# Patient Record
Sex: FEMALE | Race: WHITE | NOT HISPANIC OR LATINO | URBAN - METROPOLITAN AREA
[De-identification: names, ages, dates, MRNs, and addresses within clinical notes are randomized per-mention and may not be internally consistent; named-entity substitution may affect disease eponyms.]

---

## 2020-06-29 ENCOUNTER — EMERGENCY (EMERGENCY)
Facility: HOSPITAL | Age: 80
LOS: 0 days | Discharge: HOME | End: 2020-06-29
Payer: MEDICARE

## 2020-06-29 VITALS
RESPIRATION RATE: 16 BRPM | DIASTOLIC BLOOD PRESSURE: 55 MMHG | OXYGEN SATURATION: 100 % | SYSTOLIC BLOOD PRESSURE: 117 MMHG | HEART RATE: 68 BPM | TEMPERATURE: 98 F

## 2020-06-29 DIAGNOSIS — Z11.59 ENCOUNTER FOR SCREENING FOR OTHER VIRAL DISEASES: ICD-10-CM

## 2020-06-29 PROCEDURE — 99283 EMERGENCY DEPT VISIT LOW MDM: CPT

## 2020-06-29 PROCEDURE — U0003: CPT

## 2020-06-29 NOTE — ED STATDOCS - CLINICAL SUMMARY MEDICAL DECISION MAKING FREE TEXT BOX
patient presents with  COVID exposure.  As patient is nontoxic appearing will test for COVID and d/c.  Quarantine reviewed and return precautions reviewed.

## 2020-06-29 NOTE — ED STATDOCS - OBJECTIVE STATEMENT
Pt presents to ED with no complaints here for COVID swab.   Pt concerned for possible COVID-19.   Pt here for testing.

## 2020-06-29 NOTE — ED STATDOCS - PHYSICAL EXAMINATION
Constitutional: NAD AAOx3. Nontoxic, well appearing. Speaking full sentences  w/o distress  Eyes: EOMI, pupils equal  Head: Normocephalic atraumatic  Mouth: no airway obstruction  Cardiac: [id number]   Resp: Lungs CTAB  GI: Abd s/nt/nd  Neuro: motor and sensory intact

## 2020-06-29 NOTE — ED STATDOCS - PATIENT PORTAL LINK FT
You can access the FollowMyHealth Patient Portal offered by [facility] by registering at the following website: [email or web address]. By joining [facility]’s FollowMyHealth portal, you will also be able to view your health information using other applications (apps) compatible with our system.

## 2020-06-29 NOTE — ED ADULT NURSE NOTE - OBJECTIVE STATEMENT
DISCHARGE INSTRUCTIONS REVIEWED WITH PATIENT VERBALLY, PT VERBALIZED UNDERSTANDING OF DISCHARGE INSTRUCTIONS. PAPER COPY OF DISCHARGE INSTRUCTIONS GIVEN TO PATIENT WITH SELF GABE AND COVID 19 INFORMATION. Patient has given verbal consent for [facility] to call them with results.     [phone]

## 2020-06-30 LAB — SARS-COV-2 RNA SPEC QL NAA+PROBE: SIGNIFICANT CHANGE UP

## 2020-11-05 ENCOUNTER — APPOINTMENT (OUTPATIENT)
Dept: INTERNAL MEDICINE | Facility: CLINIC | Age: 80
End: 2020-11-05
Payer: MEDICARE

## 2020-11-05 VITALS — SYSTOLIC BLOOD PRESSURE: 110 MMHG | HEIGHT: 65 IN | DIASTOLIC BLOOD PRESSURE: 60 MMHG

## 2020-11-05 DIAGNOSIS — Z23 ENCOUNTER FOR IMMUNIZATION: ICD-10-CM

## 2020-11-05 DIAGNOSIS — K21.9 GASTRO-ESOPHAGEAL REFLUX DISEASE W/OUT ESOPHAGITIS: ICD-10-CM

## 2020-11-05 DIAGNOSIS — Z87.440 PERSONAL HISTORY OF URINARY (TRACT) INFECTIONS: ICD-10-CM

## 2020-11-05 DIAGNOSIS — S46.919A STRAIN OF UNSPECIFIED MUSCLE, FASCIA AND TENDON AT SHOULDER AND UPPER ARM LEVEL, UNSPECIFIED ARM, INITIAL ENCOUNTER: ICD-10-CM

## 2020-11-05 PROBLEM — Z00.00 ENCOUNTER FOR PREVENTIVE HEALTH EXAMINATION: Status: ACTIVE | Noted: 2020-11-05

## 2020-11-05 PROCEDURE — 90686 IIV4 VACC NO PRSV 0.5 ML IM: CPT

## 2020-11-05 PROCEDURE — G0008: CPT

## 2020-11-05 PROCEDURE — 99072 ADDL SUPL MATRL&STAF TM PHE: CPT

## 2020-11-05 PROCEDURE — 99213 OFFICE O/P EST LOW 20 MIN: CPT | Mod: 25

## 2020-11-05 RX ORDER — PANTOPRAZOLE SODIUM 20 MG/1
TABLET, DELAYED RELEASE ORAL
Refills: 0 | Status: ACTIVE | COMMUNITY

## 2020-11-05 RX ORDER — LOVASTATIN 40 MG/1
TABLET ORAL
Refills: 0 | Status: ACTIVE | COMMUNITY

## 2020-11-05 NOTE — PHYSICAL EXAM
[No Lymphadenopathy] : no lymphadenopathy [No Respiratory Distress] : no respiratory distress  [Clear to Auscultation] : lungs were clear to auscultation bilaterally [Normal] : normal rate, regular rhythm, normal S1 and S2 and no murmur heard [No Carotid Bruits] : no carotid bruits [No Edema] : there was no peripheral edema

## 2020-11-05 NOTE — HISTORY OF PRESENT ILLNESS
[de-identified] : 81 y/o female is in the office for flu shot.\par Has been well with no recent illness

## 2021-05-21 ENCOUNTER — INPATIENT (INPATIENT)
Facility: HOSPITAL | Age: 81
LOS: 3 days | Discharge: NURSING, REHAB OR LONG-TERM CARE | DRG: 522 | End: 2021-05-25
Attending: HOSPITALIST | Admitting: FAMILY MEDICINE
Payer: MEDICARE

## 2021-05-21 VITALS
DIASTOLIC BLOOD PRESSURE: 96 MMHG | SYSTOLIC BLOOD PRESSURE: 142 MMHG | WEIGHT: 151.02 LBS | RESPIRATION RATE: 18 BRPM | TEMPERATURE: 98 F | HEART RATE: 78 BPM | OXYGEN SATURATION: 98 % | HEIGHT: 65 IN

## 2021-05-21 DIAGNOSIS — S72.001A FRACTURE OF UNSPECIFIED PART OF NECK OF RIGHT FEMUR, INITIAL ENCOUNTER FOR CLOSED FRACTURE: ICD-10-CM

## 2021-05-21 LAB
ADD ON TEST-SPECIMEN IN LAB: SIGNIFICANT CHANGE UP
ADD ON TEST-SPECIMEN IN LAB: SIGNIFICANT CHANGE UP
ALBUMIN SERPL ELPH-MCNC: 4.3 G/DL — SIGNIFICANT CHANGE UP (ref 3.3–5)
ALP SERPL-CCNC: 51 U/L — SIGNIFICANT CHANGE UP (ref 40–120)
ALT FLD-CCNC: 22 U/L — SIGNIFICANT CHANGE UP (ref 12–78)
ANION GAP SERPL CALC-SCNC: 9 MMOL/L — SIGNIFICANT CHANGE UP (ref 5–17)
APPEARANCE UR: CLEAR — SIGNIFICANT CHANGE UP
APTT BLD: 31.4 SEC — SIGNIFICANT CHANGE UP (ref 27.5–35.5)
AST SERPL-CCNC: 24 U/L — SIGNIFICANT CHANGE UP (ref 15–37)
BASOPHILS # BLD AUTO: 0.04 K/UL — SIGNIFICANT CHANGE UP (ref 0–0.2)
BASOPHILS NFR BLD AUTO: 0.3 % — SIGNIFICANT CHANGE UP (ref 0–2)
BILIRUB SERPL-MCNC: 0.7 MG/DL — SIGNIFICANT CHANGE UP (ref 0.2–1.2)
BILIRUB UR-MCNC: NEGATIVE — SIGNIFICANT CHANGE UP
BUN SERPL-MCNC: 13 MG/DL — SIGNIFICANT CHANGE UP (ref 7–23)
CALCIUM SERPL-MCNC: 9.6 MG/DL — SIGNIFICANT CHANGE UP (ref 8.5–10.1)
CHLORIDE SERPL-SCNC: 101 MMOL/L — SIGNIFICANT CHANGE UP (ref 96–108)
CO2 SERPL-SCNC: 25 MMOL/L — SIGNIFICANT CHANGE UP (ref 22–31)
COLOR SPEC: YELLOW — SIGNIFICANT CHANGE UP
CREAT SERPL-MCNC: 0.47 MG/DL — LOW (ref 0.5–1.3)
DIFF PNL FLD: NEGATIVE — SIGNIFICANT CHANGE UP
EOSINOPHIL # BLD AUTO: 0.11 K/UL — SIGNIFICANT CHANGE UP (ref 0–0.5)
EOSINOPHIL NFR BLD AUTO: 0.9 % — SIGNIFICANT CHANGE UP (ref 0–6)
GLUCOSE SERPL-MCNC: 94 MG/DL — SIGNIFICANT CHANGE UP (ref 70–99)
GLUCOSE UR QL: NEGATIVE MG/DL — SIGNIFICANT CHANGE UP
HCT VFR BLD CALC: 39.1 % — SIGNIFICANT CHANGE UP (ref 34.5–45)
HGB BLD-MCNC: 13.1 G/DL — SIGNIFICANT CHANGE UP (ref 11.5–15.5)
IMM GRANULOCYTES NFR BLD AUTO: 0.5 % — SIGNIFICANT CHANGE UP (ref 0–1.5)
INR BLD: 0.99 RATIO — SIGNIFICANT CHANGE UP (ref 0.88–1.16)
KETONES UR-MCNC: ABNORMAL
LEUKOCYTE ESTERASE UR-ACNC: NEGATIVE — SIGNIFICANT CHANGE UP
LYMPHOCYTES # BLD AUTO: 1.37 K/UL — SIGNIFICANT CHANGE UP (ref 1–3.3)
LYMPHOCYTES # BLD AUTO: 11.3 % — LOW (ref 13–44)
MCHC RBC-ENTMCNC: 31.8 PG — SIGNIFICANT CHANGE UP (ref 27–34)
MCHC RBC-ENTMCNC: 33.5 GM/DL — SIGNIFICANT CHANGE UP (ref 32–36)
MCV RBC AUTO: 94.9 FL — SIGNIFICANT CHANGE UP (ref 80–100)
MONOCYTES # BLD AUTO: 0.72 K/UL — SIGNIFICANT CHANGE UP (ref 0–0.9)
MONOCYTES NFR BLD AUTO: 5.9 % — SIGNIFICANT CHANGE UP (ref 2–14)
NEUTROPHILS # BLD AUTO: 9.84 K/UL — HIGH (ref 1.8–7.4)
NEUTROPHILS NFR BLD AUTO: 81.1 % — HIGH (ref 43–77)
NITRITE UR-MCNC: NEGATIVE — SIGNIFICANT CHANGE UP
PH UR: 7 — SIGNIFICANT CHANGE UP (ref 5–8)
PLATELET # BLD AUTO: 208 K/UL — SIGNIFICANT CHANGE UP (ref 150–400)
POTASSIUM SERPL-MCNC: 3.9 MMOL/L — SIGNIFICANT CHANGE UP (ref 3.5–5.3)
POTASSIUM SERPL-SCNC: 3.9 MMOL/L — SIGNIFICANT CHANGE UP (ref 3.5–5.3)
PROT SERPL-MCNC: 7.1 GM/DL — SIGNIFICANT CHANGE UP (ref 6–8.3)
PROT UR-MCNC: NEGATIVE MG/DL — SIGNIFICANT CHANGE UP
PROTHROM AB SERPL-ACNC: 11.6 SEC — SIGNIFICANT CHANGE UP (ref 10.6–13.6)
RAPID RVP RESULT: SIGNIFICANT CHANGE UP
RBC # BLD: 4.12 M/UL — SIGNIFICANT CHANGE UP (ref 3.8–5.2)
RBC # FLD: 12.9 % — SIGNIFICANT CHANGE UP (ref 10.3–14.5)
SARS-COV-2 RNA SPEC QL NAA+PROBE: SIGNIFICANT CHANGE UP
SODIUM SERPL-SCNC: 135 MMOL/L — SIGNIFICANT CHANGE UP (ref 135–145)
SP GR SPEC: 1.01 — SIGNIFICANT CHANGE UP (ref 1.01–1.02)
TROPONIN I SERPL-MCNC: 0.1 NG/ML — HIGH (ref 0.01–0.04)
TROPONIN I SERPL-MCNC: 0.33 NG/ML — HIGH (ref 0.01–0.04)
UROBILINOGEN FLD QL: NEGATIVE MG/DL — SIGNIFICANT CHANGE UP
WBC # BLD: 12.14 K/UL — HIGH (ref 3.8–10.5)
WBC # FLD AUTO: 12.14 K/UL — HIGH (ref 3.8–10.5)

## 2021-05-21 PROCEDURE — 71045 X-RAY EXAM CHEST 1 VIEW: CPT | Mod: 26

## 2021-05-21 PROCEDURE — 97162 PT EVAL MOD COMPLEX 30 MIN: CPT | Mod: GP

## 2021-05-21 PROCEDURE — 82962 GLUCOSE BLOOD TEST: CPT

## 2021-05-21 PROCEDURE — C9399: CPT

## 2021-05-21 PROCEDURE — 73551 X-RAY EXAM OF FEMUR 1: CPT | Mod: 26,RT

## 2021-05-21 PROCEDURE — 82306 VITAMIN D 25 HYDROXY: CPT

## 2021-05-21 PROCEDURE — 81003 URINALYSIS AUTO W/O SCOPE: CPT

## 2021-05-21 PROCEDURE — 85027 COMPLETE CBC AUTOMATED: CPT

## 2021-05-21 PROCEDURE — 88311 DECALCIFY TISSUE: CPT

## 2021-05-21 PROCEDURE — U0003: CPT

## 2021-05-21 PROCEDURE — U0005: CPT

## 2021-05-21 PROCEDURE — 99285 EMERGENCY DEPT VISIT HI MDM: CPT

## 2021-05-21 PROCEDURE — 86769 SARS-COV-2 COVID-19 ANTIBODY: CPT

## 2021-05-21 PROCEDURE — 73502 X-RAY EXAM HIP UNI 2-3 VIEWS: CPT | Mod: 26,RT

## 2021-05-21 PROCEDURE — 82040 ASSAY OF SERUM ALBUMIN: CPT

## 2021-05-21 PROCEDURE — 84484 ASSAY OF TROPONIN QUANT: CPT

## 2021-05-21 PROCEDURE — C1776: CPT

## 2021-05-21 PROCEDURE — 85730 THROMBOPLASTIN TIME PARTIAL: CPT

## 2021-05-21 PROCEDURE — 85610 PROTHROMBIN TIME: CPT

## 2021-05-21 PROCEDURE — 80048 BASIC METABOLIC PNL TOTAL CA: CPT

## 2021-05-21 PROCEDURE — 97116 GAIT TRAINING THERAPY: CPT | Mod: GP

## 2021-05-21 PROCEDURE — 36415 COLL VENOUS BLD VENIPUNCTURE: CPT

## 2021-05-21 PROCEDURE — 73501 X-RAY EXAM HIP UNI 1 VIEW: CPT | Mod: RT

## 2021-05-21 PROCEDURE — 93306 TTE W/DOPPLER COMPLETE: CPT

## 2021-05-21 PROCEDURE — 88305 TISSUE EXAM BY PATHOLOGIST: CPT

## 2021-05-21 PROCEDURE — 93010 ELECTROCARDIOGRAM REPORT: CPT

## 2021-05-21 PROCEDURE — 97530 THERAPEUTIC ACTIVITIES: CPT | Mod: GP

## 2021-05-21 PROCEDURE — 93005 ELECTROCARDIOGRAM TRACING: CPT

## 2021-05-21 RX ORDER — ONDANSETRON 8 MG/1
4 TABLET, FILM COATED ORAL ONCE
Refills: 0 | Status: COMPLETED | OUTPATIENT
Start: 2021-05-21 | End: 2021-05-21

## 2021-05-21 RX ORDER — HYDROMORPHONE HYDROCHLORIDE 2 MG/ML
1 INJECTION INTRAMUSCULAR; INTRAVENOUS; SUBCUTANEOUS ONCE
Refills: 0 | Status: DISCONTINUED | OUTPATIENT
Start: 2021-05-21 | End: 2021-05-21

## 2021-05-21 RX ORDER — SODIUM CHLORIDE 9 MG/ML
1000 INJECTION, SOLUTION INTRAVENOUS
Refills: 0 | Status: DISCONTINUED | OUTPATIENT
Start: 2021-05-21 | End: 2021-05-22

## 2021-05-21 RX ORDER — SODIUM CHLORIDE 9 MG/ML
250 INJECTION INTRAMUSCULAR; INTRAVENOUS; SUBCUTANEOUS ONCE
Refills: 0 | Status: COMPLETED | OUTPATIENT
Start: 2021-05-21 | End: 2021-05-21

## 2021-05-21 RX ORDER — ASPIRIN/CALCIUM CARB/MAGNESIUM 324 MG
325 TABLET ORAL ONCE
Refills: 0 | Status: DISCONTINUED | OUTPATIENT
Start: 2021-05-21 | End: 2021-05-21

## 2021-05-21 RX ORDER — ASPIRIN/CALCIUM CARB/MAGNESIUM 324 MG
325 TABLET ORAL ONCE
Refills: 0 | Status: COMPLETED | OUTPATIENT
Start: 2021-05-21 | End: 2021-05-21

## 2021-05-21 RX ORDER — HYDROMORPHONE HYDROCHLORIDE 2 MG/ML
0.5 INJECTION INTRAMUSCULAR; INTRAVENOUS; SUBCUTANEOUS ONCE
Refills: 0 | Status: DISCONTINUED | OUTPATIENT
Start: 2021-05-21 | End: 2021-05-21

## 2021-05-21 RX ORDER — SODIUM CHLORIDE 9 MG/ML
1000 INJECTION, SOLUTION INTRAVENOUS
Refills: 0 | Status: DISCONTINUED | OUTPATIENT
Start: 2021-05-21 | End: 2021-05-21

## 2021-05-21 RX ADMIN — HYDROMORPHONE HYDROCHLORIDE 1 MILLIGRAM(S): 2 INJECTION INTRAMUSCULAR; INTRAVENOUS; SUBCUTANEOUS at 17:29

## 2021-05-21 RX ADMIN — Medication 325 MILLIGRAM(S): at 18:34

## 2021-05-21 RX ADMIN — HYDROMORPHONE HYDROCHLORIDE 0.5 MILLIGRAM(S): 2 INJECTION INTRAMUSCULAR; INTRAVENOUS; SUBCUTANEOUS at 15:42

## 2021-05-21 RX ADMIN — SODIUM CHLORIDE 250 MILLILITER(S): 9 INJECTION INTRAMUSCULAR; INTRAVENOUS; SUBCUTANEOUS at 15:42

## 2021-05-21 RX ADMIN — ONDANSETRON 4 MILLIGRAM(S): 8 TABLET, FILM COATED ORAL at 15:42

## 2021-05-21 NOTE — ED PROVIDER NOTE - CLINICAL SUMMARY MEDICAL DECISION MAKING FREE TEXT BOX
Pt here s/p fall, landing on her right hip. R leg externally rotated. Will obtain XR, pain control, reeval.

## 2021-05-21 NOTE — ED PROVIDER NOTE - MUSCULOSKELETAL, MLM
Spine appears normal, range of motion is not limited, no muscle or joint tenderness. 2cm, ecchymosis in the right hip. tender in the r hip. r leg externally rotated. both feet cold to touch. sensory intact.

## 2021-05-21 NOTE — ED PROVIDER NOTE - PROGRESS NOTE DETAILS
Spoke with Dr. Palla who recommends one adult aspirin, dvt prophylaxis and echo in am. He is aware pt is going to or tomorrow and will see pt. Darnell NOLASCO

## 2021-05-21 NOTE — ED ADULT NURSE REASSESSMENT NOTE - NS ED NURSE REASSESS COMMENT FT1
contacted MD pinzon with regard to elevated troponin and plan of care. MD bedoya advised that pt receive full dose aspirin if did not take asa this morning. Pt placed on the monitor after elevated troponin and MD bedoya advised that pt cannot go upstairs without hospitalist evaluation. Pt asymptomatic. will ctm

## 2021-05-21 NOTE — ED ADULT NURSE NOTE - OBJECTIVE STATEMENT
pt biba s/p mechanical fall. Pt reports difficulty bearing weight s/p fall due to pain. Upon exam, pain is elicited on palpation. pt in no acute distress.

## 2021-05-21 NOTE — ED ADULT NURSE NOTE - NSIMPLEMENTINTERV_GEN_ALL_ED
Implemented All Fall Risk Interventions:  [place] to call system. Call bell, personal items and telephone within reach. Instruct patient to call for assistance. Room bathroom lighting operational. Non-slip footwear when patient is off stretcher. Physically safe environment: no spills, clutter or unnecessary equipment. Stretcher in lowest position, wheels locked, appropriate side rails in place. Provide visual cue, wrist band, yellow gown, etc. Monitor gait and stability. Monitor for mental status changes and reorient to person, place, and time. Review medications for side effects contributing to fall risk. Reinforce activity limits and safety measures with patient and family.

## 2021-05-21 NOTE — ED ADULT TRIAGE NOTE - CHIEF COMPLAINT QUOTE
Patient had unwitnessed fall at home, patient reports mechanical fall, denies hitting head, denies LOC, patient fell onto right hip unable to stand after fall  found patient on ground thirty minutes after fall. Denies blood thinners. Patient given 100 fentanyl by EMS prior to arrival

## 2021-05-22 ENCOUNTER — TRANSCRIPTION ENCOUNTER (OUTPATIENT)
Age: 81
End: 2021-05-22

## 2021-05-22 ENCOUNTER — RESULT REVIEW (OUTPATIENT)
Age: 81
End: 2021-05-22

## 2021-05-22 DIAGNOSIS — I27.20 PULMONARY HYPERTENSION, UNSPECIFIED: ICD-10-CM

## 2021-05-22 DIAGNOSIS — I35.0 NONRHEUMATIC AORTIC (VALVE) STENOSIS: ICD-10-CM

## 2021-05-22 DIAGNOSIS — Z01.810 ENCOUNTER FOR PREPROCEDURAL CARDIOVASCULAR EXAMINATION: ICD-10-CM

## 2021-05-22 DIAGNOSIS — R77.8 OTHER SPECIFIED ABNORMALITIES OF PLASMA PROTEINS: ICD-10-CM

## 2021-05-22 DIAGNOSIS — E78.5 HYPERLIPIDEMIA, UNSPECIFIED: ICD-10-CM

## 2021-05-22 LAB
24R-OH-CALCIDIOL SERPL-MCNC: 54.1 NG/ML — SIGNIFICANT CHANGE UP (ref 30–80)
ALBUMIN SERPL ELPH-MCNC: 3.4 G/DL — SIGNIFICANT CHANGE UP (ref 3.3–5)
ANION GAP SERPL CALC-SCNC: 6 MMOL/L — SIGNIFICANT CHANGE UP (ref 5–17)
APTT BLD: 28.2 SEC — SIGNIFICANT CHANGE UP (ref 27.5–35.5)
BUN SERPL-MCNC: 6 MG/DL — LOW (ref 7–23)
CALCIUM SERPL-MCNC: 8.8 MG/DL — SIGNIFICANT CHANGE UP (ref 8.5–10.1)
CHLORIDE SERPL-SCNC: 101 MMOL/L — SIGNIFICANT CHANGE UP (ref 96–108)
CO2 SERPL-SCNC: 24 MMOL/L — SIGNIFICANT CHANGE UP (ref 22–31)
CREAT SERPL-MCNC: 0.39 MG/DL — LOW (ref 0.5–1.3)
GLUCOSE SERPL-MCNC: 103 MG/DL — HIGH (ref 70–99)
HCT VFR BLD CALC: 33.7 % — LOW (ref 34.5–45)
HCT VFR BLD CALC: 35.6 % — SIGNIFICANT CHANGE UP (ref 34.5–45)
HGB BLD-MCNC: 11.5 G/DL — SIGNIFICANT CHANGE UP (ref 11.5–15.5)
HGB BLD-MCNC: 11.7 G/DL — SIGNIFICANT CHANGE UP (ref 11.5–15.5)
INR BLD: 1.1 RATIO — SIGNIFICANT CHANGE UP (ref 0.88–1.16)
MCHC RBC-ENTMCNC: 31.5 PG — SIGNIFICANT CHANGE UP (ref 27–34)
MCHC RBC-ENTMCNC: 31.9 PG — SIGNIFICANT CHANGE UP (ref 27–34)
MCHC RBC-ENTMCNC: 32.9 GM/DL — SIGNIFICANT CHANGE UP (ref 32–36)
MCHC RBC-ENTMCNC: 34.1 GM/DL — SIGNIFICANT CHANGE UP (ref 32–36)
MCV RBC AUTO: 93.4 FL — SIGNIFICANT CHANGE UP (ref 80–100)
MCV RBC AUTO: 95.7 FL — SIGNIFICANT CHANGE UP (ref 80–100)
PLATELET # BLD AUTO: 160 K/UL — SIGNIFICANT CHANGE UP (ref 150–400)
PLATELET # BLD AUTO: 164 K/UL — SIGNIFICANT CHANGE UP (ref 150–400)
POTASSIUM SERPL-MCNC: 3.8 MMOL/L — SIGNIFICANT CHANGE UP (ref 3.5–5.3)
POTASSIUM SERPL-SCNC: 3.8 MMOL/L — SIGNIFICANT CHANGE UP (ref 3.5–5.3)
PROTHROM AB SERPL-ACNC: 12.7 SEC — SIGNIFICANT CHANGE UP (ref 10.6–13.6)
RBC # BLD: 3.61 M/UL — LOW (ref 3.8–5.2)
RBC # BLD: 3.72 M/UL — LOW (ref 3.8–5.2)
RBC # FLD: 12.8 % — SIGNIFICANT CHANGE UP (ref 10.3–14.5)
RBC # FLD: 12.9 % — SIGNIFICANT CHANGE UP (ref 10.3–14.5)
SODIUM SERPL-SCNC: 131 MMOL/L — LOW (ref 135–145)
TROPONIN I SERPL-MCNC: 0.24 NG/ML — HIGH (ref 0.01–0.04)
WBC # BLD: 12.12 K/UL — HIGH (ref 3.8–10.5)
WBC # BLD: 9.66 K/UL — SIGNIFICANT CHANGE UP (ref 3.8–10.5)
WBC # FLD AUTO: 12.12 K/UL — HIGH (ref 3.8–10.5)
WBC # FLD AUTO: 9.66 K/UL — SIGNIFICANT CHANGE UP (ref 3.8–10.5)

## 2021-05-22 PROCEDURE — 99223 1ST HOSP IP/OBS HIGH 75: CPT

## 2021-05-22 PROCEDURE — 93306 TTE W/DOPPLER COMPLETE: CPT | Mod: 26

## 2021-05-22 PROCEDURE — 73501 X-RAY EXAM HIP UNI 1 VIEW: CPT | Mod: 26,RT

## 2021-05-22 PROCEDURE — 88311 DECALCIFY TISSUE: CPT | Mod: 26

## 2021-05-22 PROCEDURE — 93010 ELECTROCARDIOGRAM REPORT: CPT

## 2021-05-22 PROCEDURE — 88305 TISSUE EXAM BY PATHOLOGIST: CPT | Mod: 26

## 2021-05-22 RX ORDER — METOPROLOL TARTRATE 50 MG
12.5 TABLET ORAL EVERY 12 HOURS
Refills: 0 | Status: DISCONTINUED | OUTPATIENT
Start: 2021-05-22 | End: 2021-05-22

## 2021-05-22 RX ORDER — SENNA PLUS 8.6 MG/1
2 TABLET ORAL AT BEDTIME
Refills: 0 | Status: DISCONTINUED | OUTPATIENT
Start: 2021-05-22 | End: 2021-05-25

## 2021-05-22 RX ORDER — OXYCODONE HYDROCHLORIDE 5 MG/1
2.5 TABLET ORAL EVERY 4 HOURS
Refills: 0 | Status: DISCONTINUED | OUTPATIENT
Start: 2021-05-22 | End: 2021-05-22

## 2021-05-22 RX ORDER — SODIUM CHLORIDE 9 MG/ML
1000 INJECTION INTRAMUSCULAR; INTRAVENOUS; SUBCUTANEOUS
Refills: 0 | Status: DISCONTINUED | OUTPATIENT
Start: 2021-05-22 | End: 2021-05-22

## 2021-05-22 RX ORDER — ATORVASTATIN CALCIUM 80 MG/1
40 TABLET, FILM COATED ORAL AT BEDTIME
Refills: 0 | Status: DISCONTINUED | OUTPATIENT
Start: 2021-05-22 | End: 2021-05-25

## 2021-05-22 RX ORDER — HYDROMORPHONE HYDROCHLORIDE 2 MG/ML
0.5 INJECTION INTRAMUSCULAR; INTRAVENOUS; SUBCUTANEOUS
Refills: 0 | Status: DISCONTINUED | OUTPATIENT
Start: 2021-05-22 | End: 2021-05-22

## 2021-05-22 RX ORDER — ERYTHROMYCIN BASE 5 MG/GRAM
1 OINTMENT (GRAM) OPHTHALMIC (EYE)
Refills: 0 | Status: DISCONTINUED | OUTPATIENT
Start: 2021-05-22 | End: 2021-05-25

## 2021-05-22 RX ORDER — FENTANYL CITRATE 50 UG/ML
50 INJECTION INTRAVENOUS
Refills: 0 | Status: DISCONTINUED | OUTPATIENT
Start: 2021-05-22 | End: 2021-05-22

## 2021-05-22 RX ORDER — OXYCODONE HYDROCHLORIDE 5 MG/1
5 TABLET ORAL ONCE
Refills: 0 | Status: DISCONTINUED | OUTPATIENT
Start: 2021-05-22 | End: 2021-05-22

## 2021-05-22 RX ORDER — SODIUM CHLORIDE 9 MG/ML
1000 INJECTION INTRAMUSCULAR; INTRAVENOUS; SUBCUTANEOUS
Refills: 0 | Status: DISCONTINUED | OUTPATIENT
Start: 2021-05-22 | End: 2021-05-23

## 2021-05-22 RX ORDER — ENOXAPARIN SODIUM 100 MG/ML
40 INJECTION SUBCUTANEOUS EVERY 24 HOURS
Refills: 0 | Status: DISCONTINUED | OUTPATIENT
Start: 2021-05-23 | End: 2021-05-23

## 2021-05-22 RX ORDER — SODIUM CHLORIDE 9 MG/ML
1000 INJECTION, SOLUTION INTRAVENOUS
Refills: 0 | Status: DISCONTINUED | OUTPATIENT
Start: 2021-05-22 | End: 2021-05-22

## 2021-05-22 RX ORDER — ACETAMINOPHEN 500 MG
975 TABLET ORAL EVERY 8 HOURS
Refills: 0 | Status: DISCONTINUED | OUTPATIENT
Start: 2021-05-22 | End: 2021-05-22

## 2021-05-22 RX ORDER — MAGNESIUM HYDROXIDE 400 MG/1
30 TABLET, CHEWABLE ORAL DAILY
Refills: 0 | Status: DISCONTINUED | OUTPATIENT
Start: 2021-05-22 | End: 2021-05-25

## 2021-05-22 RX ORDER — ACETAMINOPHEN 500 MG
975 TABLET ORAL EVERY 8 HOURS
Refills: 0 | Status: DISCONTINUED | OUTPATIENT
Start: 2021-05-22 | End: 2021-05-25

## 2021-05-22 RX ORDER — METOPROLOL TARTRATE 50 MG
12.5 TABLET ORAL EVERY 12 HOURS
Refills: 0 | Status: DISCONTINUED | OUTPATIENT
Start: 2021-05-22 | End: 2021-05-25

## 2021-05-22 RX ORDER — OXYCODONE HYDROCHLORIDE 5 MG/1
2.5 TABLET ORAL EVERY 4 HOURS
Refills: 0 | Status: DISCONTINUED | OUTPATIENT
Start: 2021-05-22 | End: 2021-05-25

## 2021-05-22 RX ORDER — LOVASTATIN 20 MG
1 TABLET ORAL
Qty: 0 | Refills: 0 | DISCHARGE

## 2021-05-22 RX ORDER — CEFAZOLIN SODIUM 1 G
2000 VIAL (EA) INJECTION EVERY 8 HOURS
Refills: 0 | Status: COMPLETED | OUTPATIENT
Start: 2021-05-22 | End: 2021-05-23

## 2021-05-22 RX ORDER — OXYCODONE HYDROCHLORIDE 5 MG/1
5 TABLET ORAL EVERY 4 HOURS
Refills: 0 | Status: DISCONTINUED | OUTPATIENT
Start: 2021-05-22 | End: 2021-05-25

## 2021-05-22 RX ORDER — ATORVASTATIN CALCIUM 80 MG/1
10 TABLET, FILM COATED ORAL AT BEDTIME
Refills: 0 | Status: DISCONTINUED | OUTPATIENT
Start: 2021-05-22 | End: 2021-05-22

## 2021-05-22 RX ORDER — MORPHINE SULFATE 50 MG/1
1 CAPSULE, EXTENDED RELEASE ORAL
Refills: 0 | Status: DISCONTINUED | OUTPATIENT
Start: 2021-05-22 | End: 2021-05-22

## 2021-05-22 RX ORDER — SENNA PLUS 8.6 MG/1
2 TABLET ORAL AT BEDTIME
Refills: 0 | Status: DISCONTINUED | OUTPATIENT
Start: 2021-05-22 | End: 2021-05-22

## 2021-05-22 RX ORDER — TRAMADOL HYDROCHLORIDE 50 MG/1
50 TABLET ORAL EVERY 4 HOURS
Refills: 0 | Status: DISCONTINUED | OUTPATIENT
Start: 2021-05-22 | End: 2021-05-25

## 2021-05-22 RX ORDER — ATORVASTATIN CALCIUM 80 MG/1
40 TABLET, FILM COATED ORAL AT BEDTIME
Refills: 0 | Status: DISCONTINUED | OUTPATIENT
Start: 2021-05-22 | End: 2021-05-22

## 2021-05-22 RX ORDER — ASPIRIN/CALCIUM CARB/MAGNESIUM 324 MG
81 TABLET ORAL DAILY
Refills: 0 | Status: DISCONTINUED | OUTPATIENT
Start: 2021-05-22 | End: 2021-05-22

## 2021-05-22 RX ADMIN — SODIUM CHLORIDE 125 MILLILITER(S): 9 INJECTION INTRAMUSCULAR; INTRAVENOUS; SUBCUTANEOUS at 16:13

## 2021-05-22 RX ADMIN — ATORVASTATIN CALCIUM 40 MILLIGRAM(S): 80 TABLET, FILM COATED ORAL at 21:41

## 2021-05-22 RX ADMIN — Medication 81 MILLIGRAM(S): at 09:26

## 2021-05-22 RX ADMIN — Medication 975 MILLIGRAM(S): at 22:40

## 2021-05-22 RX ADMIN — MORPHINE SULFATE 1 MILLIGRAM(S): 50 CAPSULE, EXTENDED RELEASE ORAL at 04:15

## 2021-05-22 RX ADMIN — SODIUM CHLORIDE 75 MILLILITER(S): 9 INJECTION INTRAMUSCULAR; INTRAVENOUS; SUBCUTANEOUS at 18:11

## 2021-05-22 RX ADMIN — TRAMADOL HYDROCHLORIDE 50 MILLIGRAM(S): 50 TABLET ORAL at 20:13

## 2021-05-22 RX ADMIN — SODIUM CHLORIDE 75 MILLILITER(S): 9 INJECTION, SOLUTION INTRAVENOUS at 14:50

## 2021-05-22 RX ADMIN — Medication 100 MILLIGRAM(S): at 21:41

## 2021-05-22 RX ADMIN — OXYCODONE HYDROCHLORIDE 5 MILLIGRAM(S): 5 TABLET ORAL at 14:48

## 2021-05-22 RX ADMIN — Medication 12.5 MILLIGRAM(S): at 21:41

## 2021-05-22 RX ADMIN — TRAMADOL HYDROCHLORIDE 50 MILLIGRAM(S): 50 TABLET ORAL at 22:14

## 2021-05-22 RX ADMIN — Medication 975 MILLIGRAM(S): at 06:07

## 2021-05-22 RX ADMIN — SODIUM CHLORIDE 100 MILLILITER(S): 9 INJECTION, SOLUTION INTRAVENOUS at 04:14

## 2021-05-22 RX ADMIN — Medication 975 MILLIGRAM(S): at 21:40

## 2021-05-22 RX ADMIN — Medication 1 DROP(S): at 18:42

## 2021-05-22 RX ADMIN — SENNA PLUS 2 TABLET(S): 8.6 TABLET ORAL at 21:41

## 2021-05-22 RX ADMIN — Medication 12.5 MILLIGRAM(S): at 09:25

## 2021-05-22 RX ADMIN — Medication 1 APPLICATION(S): at 21:42

## 2021-05-22 NOTE — DISCHARGE NOTE PROVIDER - NSDCFUADDINST_GEN_ALL_CORE_FT
1. Pain Control: take pain medications as needed and as prescribed  2. Full weight bearing as tolerated, with assistive devices (walker/cane) as needed  3. DVT Prophylaxis: See Med Rec for details.  4. Physical therapy as needed  5. Follow up with Dr. Taylor as outpatient in 10-14 days after discharge from the Hospital or Rehab. Please call office for appointment.  6. Arielle dressing in place, can remain in place until post-op day 7, then dry dressing okay with daily dressing changes as needed. Remove staples post-op day 14.  7. Ice affected area as needed  8. Keep dressing clean and dry   Discharge Instructions for Right Hip Hemiarthroplasty:    1. PAIN CONTROL: See Med Rec.  2. ACTIVITY: Weight Bearing as Tolerated with assistance and rolling walker. Posterior Hip Precautions. Abduction pillow while in bed or chair for 6 weeks.  3. PT: daily, Posterior Hip Precautions.  4. DVT/PE PROPHYLAXIS: Continue DVT/PE Prophylaxis. See Med Rec for Duration and dose.  5. BANDAGE: Change bandage on POD7 (5/29/21) to Mepilex Ag or other silver impregnated dressing. Also change PRN if saturated. Do NOT remove on arrival to inspect wound.  6. SHOWER: Can shower after POD7.  7. STAPLES/SUTURES: Remove by RN POD14 (6/5/21).  8. FOLLOW UP: Follow-up with Dr. Taylor in 1 month. Call office for appointment. Please perform portable x-rays of right hip and femur at Rehab POD 14 (6/5/21) before follow up.

## 2021-05-22 NOTE — PROGRESS NOTE ADULT - SUBJECTIVE AND OBJECTIVE BOX
Orthopedics Post-op Check    POD 0 R hip hemiarthroplasty  Pt seen and examined at the bedside. Pain is well controlled at this time. Pt complaining R eye pain since awakening in PACU. Pt is aware of plan and agrees, no concerns at this time.     Vital Signs Last 24 Hrs  T(C): 36.9 (05-22-21 @ 15:30), Max: 37.6 (05-22-21 @ 04:50)  T(F): 98.4 (05-22-21 @ 15:30), Max: 99.7 (05-22-21 @ 04:50)  HR: 57 (05-22-21 @ 15:30) (54 - 78)  BP: 123/51 (05-22-21 @ 15:30) (86/56 - 140/63)  BP(mean): 75 (05-22-21 @ 00:22) (75 - 83)  RR: 17 (05-22-21 @ 15:30) (12 - 18)  SpO2: 99% (05-22-21 @ 15:30) (95% - 100%)                        11.7   12.12 )-----------( 160      ( 22 May 2021 14:39 )             35.6     22 May 2021 14:39    131    |  101    |  6      ----------------------------<  103    3.8     |  24     |  0.39     Ca    8.8        22 May 2021 14:39    TPro  x      /  Alb  3.4    /  TBili  x      /  DBili  x      /  AST  x      /  ALT  x      /  AlkPhos  x      22 May 2021 05:30    PT/INR - ( 22 May 2021 05:30 )   PT: 12.7 sec;   INR: 1.10 ratio         PTT - ( 22 May 2021 05:30 )  PTT:28.2 sec    Exam:  GEN: NAD, awake and alert.  Eyes: Increased tearing from the R eye, pain with opening, no increased erythema  RLE:  Dressing clean and dry, FEDERICO  In abduction pillow  +EHL FHL TA GS  SILT L2-S1  +DP  Calf soft and nontender, compartments soft and compressible.

## 2021-05-22 NOTE — H&P ADULT - ASSESSMENT
79 y/o F PMHx significant for GERD, recurrent UTIs, and osteoarthritis who is a community ambulator via assistive devices (walker) presents to  for further evaluation and management of c/o severe right hip pain s/p unwitnessed fall at home.  The patient states that she tripped and fell onto her right side while attempting to ambulate to her kitchen. She states that she was unable to bear weight after the all prompting today's visit to . The patient was found down by her  who then notified EMS. She denies any associated prodrome of chest pain, palpitations, blurred vision, dizziness, or associated head trauma. Imaging in the ED revealed a right femoral neck fracture    #Acute Right Femoral Neck Fracture  ~admit to Medicine  ~f/u w/ Orthopedics  ~at this time plan is for surgical intervention via right hip hemiarthroplasty on 5/22  ~f/u pre-op labs in the am  ~NPO after midnight on 5/22  ~IVFs while NPO  ~NWB RLE  ~cont. pain management    #Elevated Cardiac Markers  ~TnI => 0.098 -> 0.330 -> 0.240  ~patient denies any symptoms of chest pain/shortness of breath  ~f/u 2DECHO in the am  ~f/u w/ Cardiology for further kami-operative management    #Vte ppx  ~chemical Vte ppx held after MN on 5/22  ~please f/u w/ Orthopedics concerning kami-post operative Vte ppx   81 y/o F PMHx significant for GERD, recurrent UTIs, and osteoarthritis who is a community ambulator via assistive devices (walker) presents to  for further evaluation and management of c/o severe right hip pain s/p unwitnessed fall at home.  The patient states that she tripped and fell onto her right side while attempting to ambulate to her kitchen. She states that she was unable to bear weight after the all prompting today's visit to . The patient was found down by her  who then notified EMS. She denies any associated prodrome of chest pain, palpitations, blurred vision, dizziness, or associated head trauma. Imaging in the ED revealed a right femoral neck fracture    #Acute Right Femoral Neck Fracture  ~admit to Medicine  ~f/u w/ Orthopedics  ~at this time plan is for surgical intervention via right hip hemiarthroplasty on 5/22  ~f/u pre-op labs in the am  ~NPO after midnight on 5/22  ~IVFs while NPO  ~NWB RLE  ~cont. pain management    #Elevated Cardiac Markers  ~TnI => 0.098 -> 0.330 -> 0.240  ~etiologies include overall demand ischemia  ~patient denies any symptoms of chest pain/shortness of breath  ~f/u 2DECHO in the am  ~f/u w/ Cardiology for further kami-operative management    #Vte ppx  ~chemical Vte ppx held after MN on 5/22  ~please f/u w/ Orthopedics concerning kami-post operative Vte ppx

## 2021-05-22 NOTE — PROGRESS NOTE ADULT - SUBJECTIVE AND OBJECTIVE BOX
HPI:  79 y/o F PMHx significant for GERD, recurrent UTIs, and osteoarthritis who is a community ambulator via assistive devices (walker) presents to  for further evaluation and management of c/o severe right hip pain s/p unwitnessed fall at home.  The patient states that she tripped and fell onto her right side while attempting to ambulate to her kitchen. She states that she was unable to bear weight after the all prompting today's visit to . The patient was found down by her  who then notified EMS. She denies any associated prodrome of chest pain, palpitations, blurred vision, dizziness, or associated head trauma. Imaging in the ED revealed a right femoral neck fracture    Subjective: Patient seen and examined at bedside, resting comfortably. Pain well controlled. Denies headache, lightheadedness, dizziness, chest pain, dyspnea, n/v, numbness/tingling. No complaints at this time. No overnight events.    REVIEW OF SYSTEMS:  CONSTITUTIONAL: No weakness, fevers or chills  EYES/ENT: No visual changes;  No vertigo or throat pain   NECK: No pain or stiffness  RESPIRATORY: No cough, wheezing, hemoptysis; No shortness of breath  CARDIOVASCULAR: No chest pain or palpitations  GASTROINTESTINAL: No abdominal or epigastric pain. No nausea, vomiting, or hematemesis; No diarrhea or constipation. No melena or hematochezia.  GENITOURINARY: No dysuria, frequency or hematuria  NEUROLOGICAL: No numbness or weakness  SKIN: No itching, burning, rashes, or lesions   All other review of systems is negative unless indicated above    PHYSICAL EXAM:  Vital Signs Last 24 Hrs  T(C): 37.6 (22 May 2021 04:50), Max: 37.6 (22 May 2021 04:50)  T(F): 99.7 (22 May 2021 04:50), Max: 99.7 (22 May 2021 04:50)  HR: 78 (22 May 2021 04:50) (73 - 78)  BP: 140/53 (22 May 2021 04:50) (138/78 - 142/96)  BP(mean): 75 (22 May 2021 00:22) (75 - 83)  RR: 15 (22 May 2021 04:50) (13 - 18)  SpO2: 95% (22 May 2021 04:50) (95% - 100%)    Constitutional: resting, no acute distress  HEENT: EOMI, normal hearing, moist mucous membranes  Neck: Soft and supple, no JVD  Respiratory: CTABL, No wheezing, rales or rhonchi  Cardiovascular: S1S2+, RRR, no M/G/R  Gastrointestinal: BS+, soft, NT/ND, no guarding, no rebound  Extremities: No peripheral edema, :right leg shortened and rotated externally  Vascular: 2+ peripheral pulses  Neurological: AAOx3, no focal deficits  Skin: No rashes    MEDICATIONS:  MEDICATIONS  (STANDING):  acetaminophen   Tablet .. 975 milliGRAM(s) Oral every 8 hours  atorvastatin 10 milliGRAM(s) Oral at bedtime  senna 2 Tablet(s) Oral at bedtime      LABS: All Labs Reviewed:                        11.5   9.66  )-----------( 164      ( 22 May 2021 05:30 )             33.7     05-22    129<L>  |  99  |  7   ----------------------------<  102<H>  3.7   |  24  |  0.33<L>    Ca    8.7      22 May 2021 05:30    TPro  x   /  Alb  3.4  /  TBili  x   /  DBili  x   /  AST  x   /  ALT  x   /  AlkPhos  x   05-22    PT/INR - ( 22 May 2021 05:30 )   PT: 12.7 sec;   INR: 1.10 ratio         PTT - ( 22 May 2021 05:30 )  PTT:28.2 sec  CARDIAC MARKERS ( 22 May 2021 01:04 )  0.240 ng/mL / x     / x     / x     / x      CARDIAC MARKERS ( 21 May 2021 21:14 )  0.330 ng/mL / x     / x     / x     / x      CARDIAC MARKERS ( 21 May 2021 15:08 )  0.098 ng/mL / x     / 78 U/L / x     / x          Blood Culture:   I&O's Summary    CAPILLARY BLOOD GLUCOSE          RADIOLOGY/EKG:

## 2021-05-22 NOTE — DISCHARGE NOTE PROVIDER - CARE PROVIDER_API CALL
Shahram Taylor)  Orthopaedic Surgery  [address] [place] [address] [place] [address]  Phone: [phone]  Fax: [phone]  Follow Up Time:

## 2021-05-22 NOTE — CONSULT NOTE ADULT - PROBLEM SELECTOR RECOMMENDATION 2
patient without cardiac symptoms with fair exercise capacity , with minimal elevated troponin trending down , with normal left ventricular right ventricular function , mostly due to demand ischemia ,  continue ecotrin , statin , will add low dose metoprolol .

## 2021-05-22 NOTE — H&P ADULT - NSHPPHYSICALEXAM_GEN_ALL_CORE
Vital Signs Last 24 Hrs  T(C): 36.7 (22 May 2021 00:22), Max: 36.7 (21 May 2021 14:25)  T(F): 98 (22 May 2021 00:22), Max: 98.1 (21 May 2021 14:25)  HR: 76 (22 May 2021 00:22) (73 - 78)  BP: 138/78 (22 May 2021 00:22) (138/78 - 142/96)  BP(mean): 75 (22 May 2021 00:22) (75 - 83)  RR: 15 (22 May 2021 00:22) (13 - 18)  SpO2: 96% (22 May 2021 00:22) (96% - 100%)

## 2021-05-22 NOTE — DISCHARGE NOTE PROVIDER - NSDCCPCAREPLAN_GEN_ALL_CORE_FT
PRINCIPAL DISCHARGE DIAGNOSIS  Diagnosis: Closed fracture of neck of right femur, initial encounter  Assessment and Plan of Treatment:        PRINCIPAL DISCHARGE DIAGNOSIS  Diagnosis: Closed fracture of neck of right femur, initial encounter  Assessment and Plan of Treatment: You were seen by Orthopedics and operated on your right hip. You were also evaluated by Physical Therapy and will continue your recovery in rehabilitation. Please continue to follow with your orthopedic surgeon and primary care physician for medication and pain management.       PRINCIPAL DISCHARGE DIAGNOSIS  Diagnosis: Closed fracture of neck of right femur, initial encounter  Assessment and Plan of Treatment: You were seen by Orthopedics and operated on your right hip. You were also evaluated by Physical Therapy and will continue your recovery in rehabilitation. Please continue to follow with your orthopedic surgeon and primary care physician for medication and pain   management.  You will need to take Xarelto for 35 days after your operations to prevent the formation of a blood clot wheile your mobility is decreased. Your last day to take Xarelto is 6/26/21. While on Xarelto please take protonix to protect the lining of your stomach while taking blood thinners.

## 2021-05-22 NOTE — CONSULT NOTE ADULT - PROBLEM SELECTOR RECOMMENDATION 5
moderate P HTN no prior echo to compare , has hx of smoking in the past  CXR finding suggestive COPD ,   which need to be followed as OP , will follow

## 2021-05-22 NOTE — H&P ADULT - HISTORY OF PRESENT ILLNESS
81 y/o F PMHx significant for GERD, recurrent UTIs, and osteoarthritis who is a community ambulator via assistive devices (walker) presents to  for further evaluation and management of c/o severe right hip pain s/p unwitnessed fall at home.  The patient states that she tripped and fell onto her right side while attempting to ambulate to her kitchen. She states that she was unable to bear weight after the all prompting today's visit to . The patient was found down by her  who then notified EMS. She denies any associated prodrome of chest pain, palpitations, blurred vision, dizziness, or associated head trauma. Imaging in the ED revealed a right femoral neck fracture

## 2021-05-22 NOTE — PROGRESS NOTE ADULT - SUBJECTIVE AND OBJECTIVE BOX
Patient seen and examined at bedside, resting comfortably. Pain well controlled. Denies headache, lightheadedness, dizziness, chest pain, dyspnea, n/v, numbness/tingling. No complaints at this time. No overnight events.      LABS:                        11.5   9.66  )-----------( 164      ( 22 May 2021 05:30 )             33.7         129<L>  |  99  |  7   ----------------------------<  102<H>  3.7   |  24  |  0.33<L>    Ca    8.7      22 May 2021 05:30    TPro  x   /  Alb  3.4  /  TBili  x   /  DBili  x   /  AST  x   /  ALT  x   /  AlkPhos  x       PT/INR - ( 22 May 2021 05:30 )   PT: 12.7 sec;   INR: 1.10 ratio         PTT - ( 22 May 2021 05:30 )  PTT:28.2 sec  Urinalysis Basic - ( 21 May 2021 19:34 )    Color: Yellow / Appearance: Clear / S.010 / pH: x  Gluc: x / Ketone: Moderate  / Bili: Negative / Urobili: Negative mg/dL   Blood: x / Protein: Negative mg/dL / Nitrite: Negative   Leuk Esterase: Negative / RBC: x / WBC x   Sq Epi: x / Non Sq Epi: x / Bacteria: x          Vitals  T(C): 37.6 (21 @ 04:50), Max: 37.6 (21 @ 04:50)  HR: 78 (21 @ 04:50) (73 - 78)  BP: 140/53 (21 @ 04:50) (138/78 - 142/96)  RR: 15 (21 @ 04:50) (13 - 18)  SpO2: 95% (21 @ 04:50) (95% - 100%)      PHYSICAL EXAM  General: NAD, Awake and Alert    RIGHT Lower Extremity:  Skin c/d/i  SCDs in place  L2-S1 SILT  +TA/EHL/FHL/GSC  + DP pulses  Compartments soft and compressible  Calves nontender

## 2021-05-22 NOTE — PROGRESS NOTE ADULT - ASSESSMENT
A/P:  80y M s/p R Hip HemiArthroplasty POD #0    -Advance diet as tolerated  -Discussed the eye findings with medical team.   -Pain control prn  - DVT ppx: FU AC recs  - WBAT/PT/OOB as tolerated   - Hip precautions  - FU am labs  - Med mgmt, continue home meds.  -FU PT eval  -Will reassess in am.

## 2021-05-22 NOTE — DISCHARGE NOTE PROVIDER - HOSPITAL COURSE
Orthopedic Summary  H&P:  Pt is a 80y Female    PAST MEDICAL & SURGICAL HISTORY:  No pertinent past medical history    No significant past surgical history          Now s/p Right Hip Hemiarthroplasty for fracture. Pt is afebrile with stable vital signs. Pain is controlled. Exam reveals intact EHL FHL TA GS, +DP. Dressing is clean and dry.    Hospital Course:  Patient presented to [facility] ED after a fall, found to have a right hip fracture, and admitted to the Medical Service. Pt was  medically/cardiac cleared prior to surgery. Prophylactic antibiotics were started before the procedure and continued for 24 hours. They were admitted after surgery to the orthopedic floor.  There were no orthopedic complications during the hospital stay. All home medications were continued.    Routine consults were obtained from the Anticoagulation Team for DVT/PE prophylaxis, from Physical Therapy, and followed by Medicine for Co-management. Patient was placed on  anticoagulation.  Pertinent home medications were continued.  Daily labs were followed.      On POD 0 there were no major issues. Pt received PT daily and was Discharged once cleared per Medicine.  The orthopedic Attending is aware and agrees. See addendum to DC summary per medical team below for any additional info or if any changes. Orthopedic Summary  H&P:  Pt is a 80y Female    PAST MEDICAL & SURGICAL HISTORY:  No pertinent past medical history    No significant past surgical history          Now s/p Right Hip Hemiarthroplasty for fracture. Pt is afebrile with stable vital signs. Pain is controlled. Exam reveals intact EHL FHL TA GS, +DP. Dressing is clean and dry.    Hospital Course:  Patient presented to [facility] ED after a fall, found to have a right hip fracture, and admitted to the Medical Service. Pt was  medically/cardiac cleared prior to surgery. Prophylactic antibiotics were started before the procedure and continued for 24 hours. They were admitted after surgery to the orthopedic floor.  There were no orthopedic complications during the hospital stay. All home medications were continued.    Routine consults were obtained from the Anticoagulation Team for DVT/PE prophylaxis, from Physical Therapy, and followed by Medicine for Co-management. Patient was placed on  anticoagulation.  Pertinent home medications were continued.  Daily labs were followed.      On POD 0 there were no major issues. Pt received PT daily and was Discharged once cleared per Medicine.  The orthopedic Attending is aware and agrees. See addendum to DC summary per medical team below for any additional info or if any changes.      Patient is medically cleared for Discharge from a Medical standpoint. Patient will need to follow up with her primary care physician and orthopedics at [facility] for continued management. Orthopedic Summary  H&P:  Pt is a 80y Female    PAST MEDICAL & SURGICAL HISTORY:  No pertinent past medical history    No significant past surgical history          Now s/p Right Hip Hemiarthroplasty for fracture. Pt is afebrile with stable vital signs. Pain is controlled. Exam reveals intact EHL FHL TA GS, +DP. Dressing is clean and dry.    Hospital Course:  Patient presented to [facility] ED after a fall, found to have a right hip fracture, and admitted to the Medical Service. Pt was  medically/cardiac cleared prior to surgery. Prophylactic antibiotics were started before the procedure and continued for 24 hours. They were admitted after surgery to the orthopedic floor.  There were no orthopedic complications during the hospital stay. All home medications were continued.    Routine consults were obtained from the Anticoagulation Team for DVT/PE prophylaxis, from Physical Therapy, and followed by Medicine for Co-management. Patient was placed on  anticoagulation.  Pertinent home medications were continued.  Daily labs were followed.      On POD 0 there were no major issues. Pt received PT daily and was Discharged once cleared per Medicine.  The orthopedic Attending is aware and agrees. See addendum to DC summary per medical team below for any additional info or if any changes.      Patient is medically cleared for Discharge from a Medical standpoint. Patient will need to follow up with her primary care physician and orthopedics at [facility] for continued management. Hospital course complicated by eye pain, resolved with lidocaine drop x 1. there was concern for possible corneal abrasion. Patient does not complain of any pain at this time and is cleared to be discharged to [facility]. Patient advised to f/u with her primary ophthalmologist for exam.

## 2021-05-22 NOTE — CONSULT NOTE ADULT - PROBLEM SELECTOR RECOMMENDATION 9
80 year old female with hyperlipidemia , mild aortic stenosis , moderate pulmonary hypertension without active cardiac symptoms , minimal elevated troponin , trending down , with normal ventricular function  normal ventricular systolic function , fair exercise capacity , who is stable and optimal for intermediate risk surgery , patient is intermediate risk , relatively urgent surgery , the benefit of surgery outweigh the risk ,   continue ecotrin , kami op DVT prophylaxis

## 2021-05-22 NOTE — DISCHARGE NOTE PROVIDER - NSDCMRMEDTOKEN_GEN_ALL_CORE_FT
lovastatin 20 mg oral tablet: 1 tab(s) orally once a day   acetaminophen 325 mg oral tablet: 3 tab(s) orally every 8 hours  lovastatin 20 mg oral tablet: 1 tab(s) orally once a day  magnesium hydroxide 8% oral suspension: 30 milliliter(s) orally once a day, As needed, Constipation  rivaroxaban 10 mg oral tablet: 1 tab(s) orally once a day  senna oral tablet: 2 tab(s) orally once a day (at bedtime)  traMADol 50 mg oral tablet: 1 tab(s) orally every 4 hours, As needed, Mild Pain (1 - 3)

## 2021-05-22 NOTE — DISCHARGE NOTE PROVIDER - NSDCCAREPROVSEEN_GEN_ALL_CORE_FT
Shahram Taylor [place], Shahram Leal, Eze Mesa, Mariana Mccain Brandon, Shahram Leal, Eze Mesa, Randy Barraza, Mariana Jaeger, Mook

## 2021-05-22 NOTE — PROGRESS NOTE ADULT - ASSESSMENT
81 y/o F PMHx significant for GERD, recurrent UTIs, and osteoarthritis who is a community ambulator via assistive devices (walker) presents to  for further evaluation and management of c/o severe right hip pain s/p unwitnessed fall at home.  The patient states that she tripped and fell onto her right side while attempting to ambulate to her kitchen. She states that she was unable to bear weight after the all prompting today's visit to . The patient was found down by her  who then notified EMS. She denies any associated prodrome of chest pain, palpitations, blurred vision, dizziness, or associated head trauma. Imaging in the ED revealed a right femoral neck fracture    #Acute Right Femoral Neck Fracture  ~ Orthopedics recs appreciated: To go to OR today for hemiarthroplasty  ~f/u pre-op labs in the am  ~Currently NPO  ~IVFs while NPO  ~NWB RLE  ~cont. pain management    #Pre-operative medical clearance  -      Medically optimized for planned procedure. No contraindication.    #Elevated Cardiac Markers  ~TnI => 0.098 -> 0.330 -> 0.240  ~etiologies include overall demand ischemia  ~patient denies any symptoms of chest pain/shortness of breath  ~f/u 2DECHO in the am  ~f/u w/ Cardiology for further kami-operative management    #Vte ppx  ~chemical Vte ppx held after MN on 5/22  ~Will  f/u w/ Orthopedics concerning kami-post operative Vte ppx    d/w Dr. Mesa   79 y/o F PMHx significant for GERD, recurrent UTIs, and osteoarthritis who is a community ambulator via assistive devices (walker) presents to  for further evaluation and management of c/o severe right hip pain s/p unwitnessed fall at home.  The patient states that she tripped and fell onto her right side while attempting to ambulate to her kitchen. She states that she was unable to bear weight after the all prompting today's visit to . The patient was found down by her  who then notified EMS. She denies any associated prodrome of chest pain, palpitations, blurred vision, dizziness, or associated head trauma. Imaging in the ED revealed a right femoral neck fracture    #Acute Right Femoral Neck Fracture  ~ Orthopedics recs appreciated: To go to OR today for hemiarthroplasty  ~f/u pre-op labs in the am  ~Currently NPO  ~IVFs while NPO  ~NWB RLE  ~cont. pain management    #Pre-operative medical clearance  - stable and optimal for intermediate risk surgery , patient is intermediate risk , relatively urgent surgery , the benefit of surgery outweigh the risk   - Medically optimized for planned procedure. No contraindication.    #Elevated Cardiac Markers  ~TnI => 0.098 -> 0.330 -> 0.240  ~etiologies include overall demand ischemia  ~patient denies any symptoms of chest pain/shortness of breath  ~f/u 2DECHO in the am  ~f/u w/ Cardiology for further kami-operative management    #Vte ppx  ~chemical Vte ppx held after MN on 5/22  ~Will  f/u w/ Orthopedics concerning kami-post operative Vte ppx    d/w Dr. Mesa

## 2021-05-22 NOTE — PROGRESS NOTE ADULT - ASSESSMENT
Assessment:  80y Female with RIGHT Femoral neck fracture     -Pain control  -VTE ppx: Please hold for OR today  -Bedrest, NWB on affected extremity  -NPO except medications  -IVF while NPO  -Ice as needed  -Medical management per primary team  -FU TTE  -FU AM preop labs  -Plan for Hemiarthroplasty today with Dr. Taylor  -Will discuss with attending and will advise if plan changes

## 2021-05-23 LAB
ANION GAP SERPL CALC-SCNC: 6 MMOL/L — SIGNIFICANT CHANGE UP (ref 5–17)
BUN SERPL-MCNC: 9 MG/DL — SIGNIFICANT CHANGE UP (ref 7–23)
CALCIUM SERPL-MCNC: 8.8 MG/DL — SIGNIFICANT CHANGE UP (ref 8.5–10.1)
CHLORIDE SERPL-SCNC: 101 MMOL/L — SIGNIFICANT CHANGE UP (ref 96–108)
CO2 SERPL-SCNC: 25 MMOL/L — SIGNIFICANT CHANGE UP (ref 22–31)
COVID-19 SPIKE DOMAIN AB INTERP: POSITIVE
COVID-19 SPIKE DOMAIN ANTIBODY RESULT: >250 U/ML — HIGH
CREAT SERPL-MCNC: 0.43 MG/DL — LOW (ref 0.5–1.3)
GLUCOSE SERPL-MCNC: 116 MG/DL — HIGH (ref 70–99)
HCT VFR BLD CALC: 31 % — LOW (ref 34.5–45)
HGB BLD-MCNC: 10.6 G/DL — LOW (ref 11.5–15.5)
MCHC RBC-ENTMCNC: 31.8 PG — SIGNIFICANT CHANGE UP (ref 27–34)
MCHC RBC-ENTMCNC: 34.2 GM/DL — SIGNIFICANT CHANGE UP (ref 32–36)
MCV RBC AUTO: 93.1 FL — SIGNIFICANT CHANGE UP (ref 80–100)
PLATELET # BLD AUTO: 163 K/UL — SIGNIFICANT CHANGE UP (ref 150–400)
POTASSIUM SERPL-MCNC: 3.6 MMOL/L — SIGNIFICANT CHANGE UP (ref 3.5–5.3)
POTASSIUM SERPL-SCNC: 3.6 MMOL/L — SIGNIFICANT CHANGE UP (ref 3.5–5.3)
RBC # BLD: 3.33 M/UL — LOW (ref 3.8–5.2)
RBC # FLD: 12.9 % — SIGNIFICANT CHANGE UP (ref 10.3–14.5)
SARS-COV-2 IGG+IGM SERPL QL IA: >250 U/ML — HIGH
SARS-COV-2 IGG+IGM SERPL QL IA: POSITIVE
SODIUM SERPL-SCNC: 132 MMOL/L — LOW (ref 135–145)
WBC # BLD: 8.49 K/UL — SIGNIFICANT CHANGE UP (ref 3.8–10.5)
WBC # FLD AUTO: 8.49 K/UL — SIGNIFICANT CHANGE UP (ref 3.8–10.5)

## 2021-05-23 PROCEDURE — 99233 SBSQ HOSP IP/OBS HIGH 50: CPT

## 2021-05-23 PROCEDURE — 99232 SBSQ HOSP IP/OBS MODERATE 35: CPT | Mod: GC

## 2021-05-23 PROCEDURE — 99223 1ST HOSP IP/OBS HIGH 75: CPT

## 2021-05-23 RX ORDER — POLYETHYLENE GLYCOL 3350 17 G/17G
17 POWDER, FOR SOLUTION ORAL DAILY
Refills: 0 | Status: DISCONTINUED | OUTPATIENT
Start: 2021-05-23 | End: 2021-05-25

## 2021-05-23 RX ORDER — RIVAROXABAN 15 MG-20MG
10 KIT ORAL DAILY
Refills: 0 | Status: DISCONTINUED | OUTPATIENT
Start: 2021-05-24 | End: 2021-05-25

## 2021-05-23 RX ADMIN — Medication 1 APPLICATION(S): at 18:45

## 2021-05-23 RX ADMIN — Medication 1 APPLICATION(S): at 05:34

## 2021-05-23 RX ADMIN — ATORVASTATIN CALCIUM 40 MILLIGRAM(S): 80 TABLET, FILM COATED ORAL at 20:57

## 2021-05-23 RX ADMIN — Medication 100 MILLIGRAM(S): at 05:35

## 2021-05-23 RX ADMIN — ENOXAPARIN SODIUM 40 MILLIGRAM(S): 100 INJECTION SUBCUTANEOUS at 05:34

## 2021-05-23 RX ADMIN — Medication 975 MILLIGRAM(S): at 14:23

## 2021-05-23 RX ADMIN — Medication 975 MILLIGRAM(S): at 20:57

## 2021-05-23 RX ADMIN — Medication 975 MILLIGRAM(S): at 05:34

## 2021-05-23 RX ADMIN — Medication 975 MILLIGRAM(S): at 06:34

## 2021-05-23 RX ADMIN — Medication 975 MILLIGRAM(S): at 13:01

## 2021-05-23 RX ADMIN — Medication 975 MILLIGRAM(S): at 20:56

## 2021-05-23 RX ADMIN — Medication 1 APPLICATION(S): at 11:12

## 2021-05-23 RX ADMIN — Medication 12.5 MILLIGRAM(S): at 20:57

## 2021-05-23 NOTE — PROGRESS NOTE ADULT - SUBJECTIVE AND OBJECTIVE BOX
CHIEF COMPLAINT:    SUBJECTIVE:     REVIEW OF SYSTEMS:  CONSTITUTIONAL: No weakness, fevers or chills  EYES/ENT: No visual changes;  No vertigo or throat pain   NECK: No pain or stiffness  RESPIRATORY: No cough, wheezing, hemoptysis; No shortness of breath  CARDIOVASCULAR: No chest pain or palpitations  GASTROINTESTINAL: No abdominal or epigastric pain. No nausea, vomiting, or hematemesis; No diarrhea or constipation. No melena or hematochezia.  GENITOURINARY: No dysuria, frequency or hematuria  NEUROLOGICAL: No numbness or weakness  SKIN: No itching, burning, rashes, or lesions   All other review of systems is negative unless indicated above    Vital Signs Last 24 Hrs  T(C): 36.6 (23 May 2021 08:08), Max: 36.9 (22 May 2021 15:30)  T(F): 97.9 (23 May 2021 08:08), Max: 98.4 (22 May 2021 15:30)  HR: 69 (23 May 2021 08:08) (57 - 69)  BP: 109/42 (23 May 2021 08:08) (109/42 - 123/51)  BP(mean): --  RR: 18 (23 May 2021 08:08) (17 - 18)  SpO2: 94% (23 May 2021 08:08) (94% - 100%)    I&O's Summary    22 May 2021 07:01  -  23 May 2021 07:00  --------------------------------------------------------  IN: 800 mL / OUT: 2100 mL / NET: -1300 mL        CAPILLARY BLOOD GLUCOSE      POCT Blood Glucose.: 194 mg/dL (23 May 2021 14:04)  POCT Blood Glucose.: 114 mg/dL (23 May 2021 08:07)  POCT Blood Glucose.: 135 mg/dL (23 May 2021 02:03)  POCT Blood Glucose.: 252 mg/dL (22 May 2021 20:16)      PHYSICAL EXAM:  Constitutional: NAD, awake and alert, well-developed  HEENT: PERR, EOMI, Normal Hearing, MMM  Neck: Soft and supple, No LAD, No JVD  Respiratory: Breath sounds are clear bilaterally, No wheezing, rales or rhonchi  Cardiovascular: S1 and S2, regular rate and rhythm, no Murmurs, gallops or rubs  Gastrointestinal: Bowel Sounds present, soft, nontender, nondistended, no guarding, no rebound  Extremities: No peripheral edema  Vascular: 2+ peripheral pulses  Neurological: A/O x 3, no focal deficits  Musculoskeletal: 5/5 strength b/l upper and lower extremities  Skin: No rashes    MEDICATIONS:  MEDICATIONS  (STANDING):  acetaminophen   Tablet .. 975 milliGRAM(s) Oral every 8 hours  atorvastatin 40 milliGRAM(s) Oral at bedtime  erythromycin   Ointment 1 Application(s) Left EYE four times a day  metoprolol tartrate 12.5 milliGRAM(s) Oral every 12 hours  senna 2 Tablet(s) Oral at bedtime  sodium chloride 0.9%. 1000 milliLiter(s) (75 mL/Hr) IV Continuous <Continuous>    MEDICATIONS  (PRN):  magnesium hydroxide Suspension 30 milliLiter(s) Oral daily PRN Constipation  oxyCODONE    IR 2.5 milliGRAM(s) Oral every 4 hours PRN Moderate Pain (4 - 6)  oxyCODONE    IR 5 milliGRAM(s) Oral every 4 hours PRN Severe Pain (7 - 10)  traMADol 50 milliGRAM(s) Oral every 4 hours PRN Mild Pain (1 - 3)      LABS: All Labs Reviewed:                        10.6   8.49  )-----------( 163      ( 23 May 2021 07:18 )             31.0     05-23    132<L>  |  101  |  9   ----------------------------<  116<H>  3.6   |  25  |  0.43<L>    Ca    8.8      23 May 2021 07:18    TPro  x   /  Alb  3.4  /  TBili  x   /  DBili  x   /  AST  x   /  ALT  x   /  AlkPhos  x   05-22    PT/INR - ( 22 May 2021 05:30 )   PT: 12.7 sec;   INR: 1.10 ratio         PTT - ( 22 May 2021 05:30 )  PTT:28.2 sec  CARDIAC MARKERS ( 22 May 2021 01:04 )  0.240 ng/mL / x     / x     / x     / x      CARDIAC MARKERS ( 21 May 2021 21:14 )  0.330 ng/mL / x     / x     / x     / x      CARDIAC MARKERS ( 21 May 2021 15:08 )  0.098 ng/mL / x     / 78 U/L / x     / x          Blood Culture:     RADIOLOGY/EKG:    DVT PPX:    ADVANCED DIRECTIVE:    DISPOSITION: SUBJECTIVE: Overnight, patient complained of left eye pain - however pain resolved with lidocaine drop x 1. Pain now resolved and denies any changes to her vision. Today, this morning patient has no complaints. States that her pain is controlled with medication. Denies constipation, HA, dizziness. Patient on CLD this AM- will advance to regular diet. In afternoon, patient had PT assessment and states that she was very tired afterwards. States that she thinks it would be a good idea for her to go to a rehab before going home.     REVIEW OF SYSTEMS:  CONSTITUTIONAL: fevers or chills; mild fatigue  EYES/ENT: No visual changes;  No vertigo or throat pain , no eye pain  NECK: No pain or stiffness  RESPIRATORY: No cough, wheezing, hemoptysis; No shortness of breath  CARDIOVASCULAR: No chest pain or palpitations  GASTROINTESTINAL: No abdominal or epigastric pain. No nausea, vomiting, or hematemesis; No diarrhea or constipation. No melena or hematochezia.  GENITOURINARY: No dysuria, frequency or hematuria  NEUROLOGICAL: No numbness or weakness  SKIN: No itching, burning, rashes, or lesions   All other review of systems is negative unless indicated above    Vital Signs Last 24 Hrs  T(C): 36.6 (23 May 2021 08:08), Max: 36.9 (22 May 2021 15:30)  T(F): 97.9 (23 May 2021 08:08), Max: 98.4 (22 May 2021 15:30)  HR: 69 (23 May 2021 08:08) (57 - 69)  BP: 109/42 (23 May 2021 08:08) (109/42 - 123/51)  BP(mean): --  RR: 18 (23 May 2021 08:08) (17 - 18)  SpO2: 94% (23 May 2021 08:08) (94% - 100%)    I&O's Summary    22 May 2021 07:01  -  23 May 2021 07:00  --------------------------------------------------------  IN: 800 mL / OUT: 2100 mL / NET: -1300 mL        CAPILLARY BLOOD GLUCOSE      POCT Blood Glucose.: 194 mg/dL (23 May 2021 14:04)  POCT Blood Glucose.: 114 mg/dL (23 May 2021 08:07)  POCT Blood Glucose.: 135 mg/dL (23 May 2021 02:03)  POCT Blood Glucose.: 252 mg/dL (22 May 2021 20:16)      PHYSICAL EXAM:  Constitutional: NAD, awake and alert, well-developed  HEENT: PERR, EOMI, Normal Hearing, MMM  Neck: Soft and supple, No LAD, No JVD  Respiratory: Breath sounds are clear bilaterally, No wheezing, rales or rhonchi  Cardiovascular: S1 and S2, regular rate and rhythm, no Murmurs, gallops or rubs  Gastrointestinal: Bowel Sounds present, soft, nontender, nondistended, no guarding, no rebound  Extremities: No peripheral edema  Vascular: 2+ peripheral pulses  Neurological: A/O x 3, no focal deficits  Musculoskeletal: 5/5 strength b/l upper and lower extremities  Skin: No rashes    MEDICATIONS:  MEDICATIONS  (STANDING):  acetaminophen   Tablet .. 975 milliGRAM(s) Oral every 8 hours  atorvastatin 40 milliGRAM(s) Oral at bedtime  erythromycin   Ointment 1 Application(s) Left EYE four times a day  metoprolol tartrate 12.5 milliGRAM(s) Oral every 12 hours  senna 2 Tablet(s) Oral at bedtime  sodium chloride 0.9%. 1000 milliLiter(s) (75 mL/Hr) IV Continuous <Continuous>    MEDICATIONS  (PRN):  magnesium hydroxide Suspension 30 milliLiter(s) Oral daily PRN Constipation  oxyCODONE    IR 2.5 milliGRAM(s) Oral every 4 hours PRN Moderate Pain (4 - 6)  oxyCODONE    IR 5 milliGRAM(s) Oral every 4 hours PRN Severe Pain (7 - 10)  traMADol 50 milliGRAM(s) Oral every 4 hours PRN Mild Pain (1 - 3)      LABS: All Labs Reviewed:                        10.6   8.49  )-----------( 163      ( 23 May 2021 07:18 )             31.0     05-23    132<L>  |  101  |  9   ----------------------------<  116<H>  3.6   |  25  |  0.43<L>    Ca    8.8      23 May 2021 07:18    TPro  x   /  Alb  3.4  /  TBili  x   /  DBili  x   /  AST  x   /  ALT  x   /  AlkPhos  x   05-22    PT/INR - ( 22 May 2021 05:30 )   PT: 12.7 sec;   INR: 1.10 ratio         PTT - ( 22 May 2021 05:30 )  PTT:28.2 sec  CARDIAC MARKERS ( 22 May 2021 01:04 )  0.240 ng/mL / x     / x     / x     / x      CARDIAC MARKERS ( 21 May 2021 21:14 )  0.330 ng/mL / x     / x     / x     / x      CARDIAC MARKERS ( 21 May 2021 15:08 )  0.098 ng/mL / x     / 78 U/L / x     / x          Blood Culture:     RADIOLOGY/EKG:    DVT PPX:    ADVANCED DIRECTIVE:    DISPOSITION: HPI:  79 y/o F PMHx significant for GERD, recurrent UTIs, and osteoarthritis who is a community ambulator via assistive devices (walker) presents to  for further evaluation and management of c/o severe right hip pain s/p unwitnessed fall at home.  The patient states that she tripped and fell onto her right side while attempting to ambulate to her kitchen. She states that she was unable to bear weight after the all prompting today's visit to . The patient was found down by her  who then notified EMS. She denies any associated prodrome of chest pain, palpitations, blurred vision, dizziness, or associated head trauma. Imaging in the ED revealed a right femoral neck fracture  SUBJECTIVE: Overnight, patient complained of left eye pain - however pain resolved with lidocaine drop x 1. Pain now resolved and denies any changes to her vision. Today, this morning patient has no complaints. States that her pain is controlled with medication. Denies constipation, HA, dizziness. Patient on CLD this AM- will advance to regular diet. In afternoon, patient had PT assessment and states that she was very tired afterwards. States that she thinks it would be a good idea for her to go to a rehab before going home.     REVIEW OF SYSTEMS:  CONSTITUTIONAL: fevers or chills; mild fatigue  EYES/ENT: No visual changes;  No vertigo or throat pain , no eye pain  NECK: No pain or stiffness  RESPIRATORY: No cough, wheezing, hemoptysis; No shortness of breath  CARDIOVASCULAR: No chest pain or palpitations  GASTROINTESTINAL: No abdominal or epigastric pain. No nausea, vomiting, or hematemesis; No diarrhea or constipation. No melena or hematochezia.  GENITOURINARY: No dysuria, frequency or hematuria  NEUROLOGICAL: No numbness or weakness  SKIN: No itching, burning, rashes, or lesions   All other review of systems is negative unless indicated above    Vital Signs Last 24 Hrs  T(C): 36.6 (23 May 2021 08:08), Max: 36.9 (22 May 2021 15:30)  T(F): 97.9 (23 May 2021 08:08), Max: 98.4 (22 May 2021 15:30)  HR: 69 (23 May 2021 08:08) (57 - 69)  BP: 109/42 (23 May 2021 08:08) (109/42 - 123/51)  BP(mean): --  RR: 18 (23 May 2021 08:08) (17 - 18)  SpO2: 94% (23 May 2021 08:08) (94% - 100%)    I&O's Summary    22 May 2021 07:01  -  23 May 2021 07:00  --------------------------------------------------------  IN: 800 mL / OUT: 2100 mL / NET: -1300 mL        CAPILLARY BLOOD GLUCOSE      POCT Blood Glucose.: 194 mg/dL (23 May 2021 14:04)  POCT Blood Glucose.: 114 mg/dL (23 May 2021 08:07)  POCT Blood Glucose.: 135 mg/dL (23 May 2021 02:03)  POCT Blood Glucose.: 252 mg/dL (22 May 2021 20:16)    Physical Exam  Constitutional: resting, no acute distress  HEENT: EOMI, normal hearing, moist mucous membranes  Neck: Soft and supple, no JVD  Respiratory: CTABL, No wheezing, rales or rhonchi  Cardiovascular: S1S2+, RRR, no M/G/R  Gastrointestinal: BS+, soft, NT/ND, no guarding, no rebound  Extremities: No peripheral edema,dressing C/D/I Rt hip  Vascular: 2+ peripheral pulses  Neurological: AAOx3, no focal deficits  Skin: No rashes        MEDICATIONS:  MEDICATIONS  (STANDING):  acetaminophen   Tablet .. 975 milliGRAM(s) Oral every 8 hours  atorvastatin 40 milliGRAM(s) Oral at bedtime  erythromycin   Ointment 1 Application(s) Left EYE four times a day  metoprolol tartrate 12.5 milliGRAM(s) Oral every 12 hours  senna 2 Tablet(s) Oral at bedtime  sodium chloride 0.9%. 1000 milliLiter(s) (75 mL/Hr) IV Continuous <Continuous>    MEDICATIONS  (PRN):  magnesium hydroxide Suspension 30 milliLiter(s) Oral daily PRN Constipation  oxyCODONE    IR 2.5 milliGRAM(s) Oral every 4 hours PRN Moderate Pain (4 - 6)  oxyCODONE    IR 5 milliGRAM(s) Oral every 4 hours PRN Severe Pain (7 - 10)  traMADol 50 milliGRAM(s) Oral every 4 hours PRN Mild Pain (1 - 3)      LABS: All Labs Reviewed:                        10.6   8.49  )-----------( 163      ( 23 May 2021 07:18 )             31.0     05-23    132<L>  |  101  |  9   ----------------------------<  116<H>  3.6   |  25  |  0.43<L>    Ca    8.8      23 May 2021 07:18    TPro  x   /  Alb  3.4  /  TBili  x   /  DBili  x   /  AST  x   /  ALT  x   /  AlkPhos  x   05-22    PT/INR - ( 22 May 2021 05:30 )   PT: 12.7 sec;   INR: 1.10 ratio         PTT - ( 22 May 2021 05:30 )  PTT:28.2 sec  CARDIAC MARKERS ( 22 May 2021 01:04 )  0.240 ng/mL / x     / x     / x     / x      CARDIAC MARKERS ( 21 May 2021 21:14 )  0.330 ng/mL / x     / x     / x     / x      CARDIAC MARKERS ( 21 May 2021 15:08 )  0.098 ng/mL / x     / 78 U/L / x     / x        < from: Xray Hip w/ Pelvis 2 or 3 Views, Right (05.21.21 @ 16:04) >  CLINICAL INFORMATION:  Injury withPain.    TECHNIQUE:   AP and oblique views of the hip were obtained. AP pelvis  AP lateral RIGHT femur radiographs  FINDINGS:   No prior exams are available for comparison.    There is a displaced superiorly displaced subcapital RIGHT femoral neck fracture with the femoral head remaining in acetabulum. Remaining osseous pelvis and distal RIGHT femur intact.    IMPRESSION:    Displaced RIGHT hip subcapital fracture deformity.    < end of copied text >    < from: Xray Femur 1 View, Right (05.21.21 @ 16:04) >  AP lateral RIGHT femur radiographs  FINDINGS:   No prior exams are available for comparison.    There is a displaced superiorly displaced subcapital RIGHT femoral neck fracture with the femoral head remaining in acetabulum. Remaining osseous pelvis and distal RIGHT femur intact.    IMPRESSION:    Displaced RIGHT hip subcapital fracture deformity.    < end of copied text >

## 2021-05-23 NOTE — PROGRESS NOTE ADULT - ASSESSMENT
Assessment:  80y Female s/p RIGHT Hip Hemiarthroplasty POD #1    -Pain control  -VTE ppx: pending AC team   -WBAT/OOB with assistance as needed  -Posterior hip precautions  -Abduction pillow in place at all times while in bed or chair  -PT/OT  -Ice as needed  -Encourage incentive spirometry  -Medical management per primary team  -DC planning: pending PT eval  -Will discuss with attending and will advise if plan changes.

## 2021-05-23 NOTE — PROGRESS NOTE ADULT - ASSESSMENT
Pt has been seen and examined with FP resident, resident supervised agree with a/p       Patient is a 80y old  Female who presents with a chief complaint of Fall at home with resultant right hip pain. (23 May 2021 09:52)      HPI:  81 y/o F PMHx significant for GERD, recurrent UTIs, and osteoarthritis who is a community ambulator via assistive devices (walker) presents to  for further evaluation and management of c/o severe right hip pain s/p unwitnessed fall at home.        PHYSICAL EXAM:  Vital Signs Last 24 Hrs  T(C): 36.6 (23 May 2021 08:08), Max: 36.9 (22 May 2021 15:30)  T(F): 97.9 (23 May 2021 08:08), Max: 98.4 (22 May 2021 15:30)  HR: 69 (23 May 2021 08:08) (54 - 69)  BP: 109/42 (23 May 2021 08:08) (86/56 - 129/49)  BP(mean): --  RR: 18 (23 May 2021 08:08) (12 - 18)  SpO2: 94% (23 May 2021 08:08) (94% - 100%)  -rs-aeeb, cta  -cvs-s1s2 normal   -p/a-soft,bs+      A/P    #pain control, discharge plan

## 2021-05-23 NOTE — PROGRESS NOTE ADULT - SUBJECTIVE AND OBJECTIVE BOX
CHIEF COMPLAINT: fell ,after loosing balance     HPI:  79 y/o F PMHx significant for GERD, recurrent UTIs, and osteoarthritis who is a community ambulator via assistive devices (walker) presents to  for further evaluation and management of c/o severe right hip pain s/p unwitnessed fall at home.  The patient states that she tripped and fell onto her right side while attempting to ambulate to her kitchen. She states that she was unable to bear weight after the all prompting today's visit to . The patient was found down by her  who then notified EMS. She denies any associated prodrome of chest pain, palpitations, blurred vision, dizziness, or associated head trauma. Imaging in the ED revealed a right femoral neck fracture. Patient blood work showed minimal elevated troponin , warranted cardiology evaluation . Patient hx of cardiac murmur , hyperlipidemia , GERD. mild unsteady gait  patient was able to walk with walker for long without chest pain or shortness of breath recently     5/23/21: Patient awake, alert and oriented, lying in bed.  Feeling good.  Offers no complaints of chest pain or shortness of breath.      MEDICATIONS  (STANDING):  acetaminophen   Tablet .. 975 milliGRAM(s) Oral every 8 hours  atorvastatin 40 milliGRAM(s) Oral at bedtime  enoxaparin Injectable 40 milliGRAM(s) SubCutaneous every 24 hours  erythromycin   Ointment 1 Application(s) Left EYE four times a day  metoprolol tartrate 12.5 milliGRAM(s) Oral every 12 hours  senna 2 Tablet(s) Oral at bedtime  sodium chloride 0.9%. 1000 milliLiter(s) (75 mL/Hr) IV Continuous <Continuous>    MEDICATIONS  (PRN):  magnesium hydroxide Suspension 30 milliLiter(s) Oral daily PRN Constipation  oxyCODONE    IR 2.5 milliGRAM(s) Oral every 4 hours PRN Moderate Pain (4 - 6)  oxyCODONE    IR 5 milliGRAM(s) Oral every 4 hours PRN Severe Pain (7 - 10)  traMADol 50 milliGRAM(s) Oral every 4 hours PRN Mild Pain (1 - 3)      Vital Signs Last 24 Hrs  T(C): 36.6 (23 May 2021 08:08), Max: 36.9 (22 May 2021 15:30)  T(F): 97.9 (23 May 2021 08:08), Max: 98.4 (22 May 2021 15:30)  HR: 69 (23 May 2021 08:08) (54 - 69)  BP: 109/42 (23 May 2021 08:08) (86/56 - 129/49)  BP(mean): --  RR: 18 (23 May 2021 08:08) (12 - 18)  SpO2: 94% (23 May 2021 08:08) (94% - 100%)    I&O's Summary    22 May 2021 07:01  -  23 May 2021 07:00  --------------------------------------------------------  IN: 800 mL / OUT: 2100 mL / NET: -1300 mL      PHYSICAL EXAM:    Constitutional: NAD, awake and alert, well-developed  HEENT: PERR, EOMI,  No oral cyananosis.  Neck:  supple,  No JVD bilateral carotid bruit  Respiratory: Breath sounds are clear bilaterally, No wheezing, rales or rhonchi  Cardiovascular: S1 and S2, regular rate and rhythm, 3/6 BRENNON radiating to carotids  gallops or rubs  Gastrointestinal: soft, nontender.   Extremities: No peripheral edema. No clubbing or cyanosis.  Vascular: 2+ peripheral pulses  Neurological: A/O x 3, no focal deficits  Musculoskeletal: no calf tenderness. right hip fracture pain on movement ,   Skin: No rashes.    Tele: RSR 60's, PVC's      LABS: All Labs Reviewed:                          10.6   8.49  )-----------( 163      ( 23 May 2021 07:18 )             31.0                           11.5   9.66  )-----------( 164      ( 22 May 2021 05:30 )             33.7                         13.1   12.14 )-----------( 208      ( 21 May 2021 15:08 )             39.1     05-23    132<L>  |  101  |  9   ----------------------------<  116<H>  3.6   |  25  |  0.43<L>    Ca    8.8      23 May 2021 07:18    TPro  x   /  Alb  3.4  /  TBili  x   /  DBili  x   /  AST  x   /  ALT  x   /  AlkPhos  x   05-22      22 May 2021 05:30    129    |  99     |  7      ----------------------------<  102    3.7     |  24     |  0.33   21 May 2021 15:08    135    |  101    |  13     ----------------------------<  94     3.9     |  25     |  0.47     Ca    8.7        22 May 2021 05:30  Ca    9.6        21 May 2021 15:08    TPro  x      /  Alb  3.4    /  TBili  x      /  DBili  x      /  AST  x      /  ALT  x      /  AlkPhos  x      22 May 2021 05:30  TPro  7.1    /  Alb  4.3    /  TBili  0.7    /  DBili  x      /  AST  24     /  ALT  22     /  AlkPhos  51     21 May 2021 15:08    PTT - ( 22 May 2021 05:30 )  PTT:28.2 sec  PT/INR - ( 22 May 2021 05:30 )   PT: 12.7 sec;   INR: 1.10 ratio         PTT - ( 22 May 2021 05:30 )  PTT:28.2 sec  CARDIAC MARKERS ( 22 May 2021 01:04 )  0.240 ng/mL / x     / x     / x     / x      CARDIAC MARKERS ( 21 May 2021 21:14 )  0.330 ng/mL / x     / x     / x     / x      CARDIAC MARKERS ( 21 May 2021 15:08 )  0.098 ng/mL / x     / 78 U/L / x     / x          Blood Culture:   05-21 @ 15:08  Pro Bnp 444  EKG normal sinus rhythm, non specific ST changes   5/22/21 normal sinus rhythm (( reviewed by me       ECHO  Mild  LVH Normal EF 65% Mild DD , mild aortic stenosis , Mild to moderate TR with moderate pulmonary hypertension       CXR hyper inflated lungs possible  COPD

## 2021-05-23 NOTE — CONSULT NOTE ADULT - ASSESSMENT
This is an 80 year old female s/p fall sustaining right hip fracture now POD #1 s/p hemiarthroplasty with high risk for VTE due to age, BMI, impaired mobility, and surgery. She is low risk for bleeding.  Discussed Xarelto and the risks and benefits with patient. Emphasized the importance of taking medication daily to prevent VTE. Verbalized understanding and is in agreement with treatment plan.      Plan:  ::Dc Lovenox  ::Xarelto 10 mg PO daily x 35 days total starting tomorrow 5/24 with last dose 6/26/21  ::PPI- Protonix 40 mg daily  ::Daily CBC/BMP  ::Enc. ambulation  ::Julio    Thank you for this consult, will continue to follow.  Dispo: Rehab vs home
A/P: 80F w/ R FN fx    Plan:    -Plan for surgical intervention R hipHemi arthroplasty , will book and begin preop.  -Preop labs/imaging: CBC/BMP/PT/PTT/INR/T&S/Covid/CXR/EKG.  -NPO after midnight/IVFs while NPO.  -NWB RLE  -DVT ppx  -Pain control prn  -Medical management, continue home meds.  -Please document medical clearance  -COnsent in chart  -Case discussed with attending, will advise if plan changes.

## 2021-05-23 NOTE — CONSULT NOTE ADULT - SUBJECTIVE AND OBJECTIVE BOX
CHIEF COMPLAINT: fell ,after loosing balance     HPI:  81 y/o F PMHx significant for GERD, recurrent UTIs, and osteoarthritis who is a community ambulator via assistive devices (walker) presents to  for further evaluation and management of c/o severe right hip pain s/p unwitnessed fall at home.  The patient states that she tripped and fell onto her right side while attempting to ambulate to her kitchen. She states that she was unable to bear weight after the all prompting today's visit to . The patient was found down by her  who then notified EMS. She denies any associated prodrome of chest pain, palpitations, blurred vision, dizziness, or associated head trauma. Imaging in the ED revealed a right femoral neck fracture. Patient blood work showed minimal elevated troponin , warranted cardiology evaluation . Patient hx of cardiac murmur , hyperlipidemia , GERD. mild unsteady gait  patient was able to walk with walker for long without chest pain or shortness of breath recently         PAST MEDICAL & SURGICAL HISTORY:  as above  GERD many years ago , resolved no recent recurrence     No significant past surgical history        Allergies    penicillin (Unknown)    Intolerances        SOCIAL HISTORY: former smoker     FAMILY HISTORY:  No pertinent family history in first degree relatives        MEDICATIONS:Home Medications:  lovastatin 20 mg oral tablet: 1 tab(s) orally once a day (22 May 2021 02:55)    MEDICATIONS  (STANDING):  acetaminophen   Tablet .. 975 milliGRAM(s) Oral every 8 hours  atorvastatin 10 milliGRAM(s) Oral at bedtime  lactated ringers. 1000 milliLiter(s) (100 mL/Hr) IV Continuous <Continuous>  senna 2 Tablet(s) Oral at bedtime    MEDICATIONS  (PRN):  morphine  - Injectable 1 milliGRAM(s) IV Push every 2 hours PRN Severe Pain (7 - 10)  oxyCODONE    IR 2.5 milliGRAM(s) Oral every 4 hours PRN Moderate Pain (4 - 6)      REVIEW OF SYSTEMS:  right hip pain     CONSTITUTIONAL: No weakness, fevers or chills  EYES/ENT: No visual changes;  No vertigo or throat pain   NECK: No pain or stiffness  RESPIRATORY: No cough, wheezing, hemoptysis; No shortness of breath  CARDIOVASCULAR: No chest pain or palpitations  GASTROINTESTINAL: No abdominal or epigastric pain. No nausea, vomiting, or hematemesis; No diarrhea or constipation. No melena or hematochezia.  GENITOURINARY: No dysuria, frequency or hematuria  NEUROLOGICAL: No numbness or weakness  SKIN: No itching, burning, rashes, or lesions   All other review of systems is negative unless indicated above    Vital Signs Last 24 Hrs  T(C): 37.6 (22 May 2021 04:50), Max: 37.6 (22 May 2021 04:50)  T(F): 99.7 (22 May 2021 04:50), Max: 99.7 (22 May 2021 04:50)  HR: 78 (22 May 2021 04:50) (73 - 78)  BP: 140/53 (22 May 2021 04:50) (138/78 - 142/96)  BP(mean): 75 (22 May 2021 00:22) (75 - 83)  RR: 15 (22 May 2021 04:50) (13 - 18)  SpO2: 95% (22 May 2021 04:50) (95% - 100%)    I&O's Summary      PHYSICAL EXAM:    Constitutional: NAD, awake and alert, well-developed  HEENT: PERR, EOMI,  No oral cyananosis.  Neck:  supple,  No JVD bilateral carotid bruit  Respiratory: Breath sounds are clear bilaterally, No wheezing, rales or rhonchi  Cardiovascular: S1 and S2, regular rate and rhythm, 3/6/ ESM radiating to carotids  gallops or rubs  Gastrointestinal: Bowel Sounds present, soft, nontender.   Extremities: No peripheral edema. No clubbing or cyanosis.  Vascular: 2+ peripheral pulses  Neurological: A/O x 3, no focal deficits  Musculoskeletal: no calf tenderness. right hip fracture pain on movement ,   Skin: No rashes.      LABS: All Labs Reviewed:                        11.5   9.66  )-----------( 164      ( 22 May 2021 05:30 )             33.7                         13.1   12.14 )-----------( 208      ( 21 May 2021 15:08 )             39.1     22 May 2021 05:30    129    |  99     |  7      ----------------------------<  102    3.7     |  24     |  0.33   21 May 2021 15:08    135    |  101    |  13     ----------------------------<  94     3.9     |  25     |  0.47     Ca    8.7        22 May 2021 05:30  Ca    9.6        21 May 2021 15:08    TPro  x      /  Alb  3.4    /  TBili  x      /  DBili  x      /  AST  x      /  ALT  x      /  AlkPhos  x      22 May 2021 05:30  TPro  7.1    /  Alb  4.3    /  TBili  0.7    /  DBili  x      /  AST  24     /  ALT  22     /  AlkPhos  51     21 May 2021 15:08    PT/INR - ( 22 May 2021 05:30 )   PT: 12.7 sec;   INR: 1.10 ratio         PTT - ( 22 May 2021 05:30 )  PTT:28.2 sec  CARDIAC MARKERS ( 22 May 2021 01:04 )  0.240 ng/mL / x     / x     / x     / x      CARDIAC MARKERS ( 21 May 2021 21:14 )  0.330 ng/mL / x     / x     / x     / x      CARDIAC MARKERS ( 21 May 2021 15:08 )  0.098 ng/mL / x     / 78 U/L / x     / x          Blood Culture:   05-21 @ 15:08  Pro Bnp 444  EKG normal sinus rhythm, non specific ST changes   5/22/21 normal sinus rhythm (( reviewed by me       ECHO  Mild  LVH Normal EF 65% Mild DD , mild aortic stenosis , Mild to moderate TR with moderate pulmonary hypertension       CXR hyper inflated lungs possible  COPD   
HPI:  79 y/o F PMHx significant for GERD, recurrent UTIs, and osteoarthritis who is a community ambulator via assistive devices (walker) presents to  for further evaluation and management of c/o severe right hip pain s/p unwitnessed fall at home.  The patient states that she tripped and fell onto her right side while attempting to ambulate to her kitchen. She states that she was unable to bear weight after the all prompting today's visit to . The patient was found down by her  who then notified EMS. She denies any associated prodrome of chest pain, palpitations, blurred vision, dizziness, or associated head trauma. Imaging in the ED revealed a right femoral neck fracture    Patient is a 80y old  Female who presents with a chief complaint of right hip pain s/p right hip hemiarthroplasty 21.    Consulted by Dr. Taylor for VTE prophylaxis, risk stratification, and anticoagulation management.    PAST MEDICAL & SURGICAL HISTORY:  No pertinent past medical history    No significant past surgical history    Interval History:  21: Patient seen at bedside on 3 [address] OOB to chair. She reports "Not so much painful but I feel so stiff." Made aware that she needs VTE prophylaxis and I recommend Xarelto. She is familiar with this medication as her  is on the full dose for atrial fibrillation. She denies any issues or concerns with the medication.    BMI: 25.8    CrCl:149.4    Caprini VTE Risk Score:  CAPRINI SCORE  AGE RELATED RISK FACTORS                                                       MOBILITY RELATED FACTORS  [ ] Age 41-60 years                                            (1 Point)                  [ ] Bed rest /restricted mobility                      (1 Point)  [ ] Age: 61-74 years                                           (2 Points)                [ ] Plaster cast                                                   (2 Points)  [ x] Age= 75 years                                              (3 Points)                 [ ] Bed bound for more than 72 hours                   (2 Points)    DISEASE RELATED RISK FACTORS                                               GENDER SPECIFIC FACTORS  [ ] Edema in the lower extremities                       (1 Point)           [ ] Pregnancy                                                            (1 Point)  [ ] Varicose veins                                               (1 Point)                  [ ] Post-partum < 6 weeks                                      (1 Point)             [x ] BMI > 25 Kg/m2                                            (1 Point)                  [ ] Hormonal therapy or oral contraception       (1 Point)                 [ ] Sepsis (in the previous month)                        (1 Point)             [ ] History of pregnancy complications                (1Point)  [ ] Pneumonia or serious lung disease                                             [ ] Unexplained or recurrent  (=/>3), premature                                 (In the previous month)                               (1 Point)                birth with toxemia or growth-restricted infant (1 Point)  [ ] Abnormal pulmonary function test            (1 Point)                                   SURGERY RELATED RISK FACTORS  [ ] Acute myocardial infarction                       (1 Point)                  [ ]  Section                                         (1 Point)  [ ] Congestive heart failure (in the previous month) (1 Point)   [ ] Minor surgery   lasting <45 minutes       (1 Point)   [ ] Inflammatory bowel disease                             (1 Point)          [ ] Arthroscopic surgery                                  (2 Points)  [ ] Central venous access                                    (2 Points)            [ ] General surgery lasting >45 minutes      (2 Points)       [ ] Stroke (in the previous month)                  (5 Points)            [ ] Elective major lower extremity arthroplasty (5 Points)                                   [  ] Malignancy (present or past include skin melanoma                                          but exclude  basal skin cell)    (2 points)                                      TRAUMA RELATED RISK FACTORS                HEMATOLOGY RELATED FACTORS                                  [x ] Fracture of the hip, pelvis, or leg                       (5 Points)  [ ] Prior episodes of VTE                                     (3 Points)          [ ] Acute spinal cord injury (in the previous month)  (5 Points)  [ ] Positive family history for VTE                         (3 Points)       [ ] Paralysis (less than 1 month)                          (5 Points)  [ ] Prothrombin 95028 A                                      (3 Points)         [ ] Multiple Trauma (within 1month)                 (5Points)                                                                                                                                                                [ ] Factor V Leiden                                          (3 Points)                                OTHER RISK FACTORS                          [ ] Lupus anticoagulants                                     (3 Points)                     [ ] BMI > 40                          (1 Point)                                                         [ ] Anticardiolipin antibodies                                (3 Points)                 [ ] Smoking                              (1Point)                                                [ ] High homocysteine in the blood                      (3 Points)                [  ] Diabetes requiring insulin (1point)                         [ ] Other congenital or acquired thrombophilia       (3 Points)          [  ] Chemotherapy                   (1 Point)  [ ] Heparin induced thrombocytopenia                  (3 Points)             [  ] Blood Transfusion                (1 point)                                                                                                             Total Score [     9     ]                                                                                                                                                                                                                                                                                                                                                                                                                                         IMPROVE Bleeding Risk Score:1.5      Falls Risk:   High (x  )  Mod (  )  Low (  )      FAMILY HISTORY:  No pertinent family history in first degree relatives      Denies any personal or familial history of clotting or bleeding disorders.    Allergies    penicillin (Unknown)    Intolerances        REVIEW OF SYSTEMS    (  )Fever	        (  )Constipation	(  )SOB				  (  )Headache   (  )Dysuria  (  )Chills	        (  )Melena	        (  )Dyspnea on exertion (  )Dizziness    (  )Polyuria  (  )Nausea      (  )Hematochezia	(  )Cough                          (  )Syncope      (  )Hematuria  (  )Vomiting   (  )Chest Pain	        (  )Wheezing			  (  )Weakness  (  )Diarrhea    (  )Palpitations	(  )Anorexia			  (x  )Myalgia       (  x ) Arthralgia    Pertinent positives in HPI and daily subjective. All other systems negative.    Vital Signs Last 24 Hrs  T(C): 36.6 (21 @ 08:08), Max: 36.9 (21 @ 15:30)  T(F): 97.9 (21 @ 08:08), Max: 98.4 (21 @ 15:30)  HR: 69 (21 @ 08:08) (54 - 69)  BP: 109/42 (21 @ 08:08) (86/56 - 129/49)  BP(mean): --  RR: 18 (21 @ 08:08) (12 - 18)  SpO2: 94% (21 @ 08:08) (94% - 100%)      PHYSICAL EXAM:    Constitutional: Appears Well    Neurological: A& O x 3    Skin: Warm    Respiratory and Thorax: normal effort; Breath sounds: normal; No rales/wheezing/rhonchi  	  Cardiovascular: S1, S2, regular, NMBR	    Gastrointestinal: BS + x 4Q, nontender	    Genitourinary:  Bladder nondistended, nontender    Musculoskeletal:   General Right:   no muscle/joint tenderness,   normal tone, no joint swelling,   ROM: limited	    General Left:   no muscle/joint tenderness,   normal tone, no joint swelling,   ROM: full    Hip:  Right: Dressing CDI    Lower extrems:   Right: no calf tenderness              negative katharina's sign               + pedal pulses    Left:   no calf tenderness              negative ktaharina's sign               + pedal pulses                          10.6   8.49  )-----------( 163      ( 23 May 2021 07:18 )             31.0           132<L>  |  101  |  9   ----------------------------<  116<H>  3.6   |  25  |  0.43<L>    Ca    8.8      23 May 2021 07:18    TPro  x   /  Alb  3.4  /  TBili  x   /  DBili  x   /  AST  x   /  ALT  x   /  AlkPhos  x         PT/INR - ( 22 May 2021 05:30 )   PT: 12.7 sec;   INR: 1.10 ratio         PTT - ( 22 May 2021 05:30 )  PTT:28.2 sec				    MEDICATIONS  (STANDING):  acetaminophen   Tablet .. 975 milliGRAM(s) Oral every 8 hours  atorvastatin 40 milliGRAM(s) Oral at bedtime  erythromycin   Ointment 1 Application(s) Left EYE four times a day  metoprolol tartrate 12.5 milliGRAM(s) Oral every 12 hours  senna 2 Tablet(s) Oral at bedtime  sodium chloride 0.9%. 1000 milliLiter(s) IV Continuous <Continuous>              **Current DVT Prophylaxis:    LMWH                   (  )  Heparin SQ           (  )  Coumadin             (  )  Xarelto                  ( x )  Eliquis                   (  )  Venodynes           (x  )  Ambulation          ( x )  UFH                       (  )  ECASA                   (  )  Contraindicated  (  )          
Patient is a 80yFemale home ambulator with walker for assistive devices who presents to  ED w/ a c/o of R hip pain after falling at home. Patient states that he tripped and purely mechanical fall , denies preceding CP/SOB/palpitations/N/v/Headache/confusion/dizziness/weakness/fatigue. Denies Head trauma/LOC. States inability to walk immediately following the injury. Denies any numbness or tingling. Denies having any other pain elsewhere. Known osteoarthritis.  No other orthopedic concerns at this time.    No pertinent past medical history            penicillin (Unknown)      PHYSICAL EXAM:  T(C): 36.7 (05-21-21 @ 14:25), Max: 36.7 (05-21-21 @ 14:25)  HR: 78 (05-21-21 @ 14:25) (78 - 78)  BP: 142/96 (05-21-21 @ 14:25) (142/96 - 142/96)  RR: 18 (05-21-21 @ 14:25) (18 - 18)  SpO2: 98% (05-21-21 @ 14:25) (98% - 98%)    Gen: NAD, Resting comfortably    RLE:  Skin intact, no erythema or ecchymosis  Right leg shortened and externally rotated.  No bony tenderness to palpation  +EHL/FHL/TA/GSC  +SILT L3-S1  + DP  Compartments soft and compressible  No calf tenderness    Secondary Survey:   No TTP over bony prominences, SILT, palpable pulses, full/painless A/PROM, compartments soft. No TTP over spinous processes or paraspinal muscles at C/T/L spine. No palpable step off. No other injuries or complaints.

## 2021-05-23 NOTE — PHYSICAL THERAPY INITIAL EVALUATION ADULT - PERTINENT HX OF CURRENT PROBLEM, REHAB EVAL
79 y/o F PMHx significant for GERD, recurrent UTIs, and osteoarthritis who is a community ambulator via assistive devices (walker) presents to  for further evaluation and management of c/o severe right hip pain s/p unwitnessed fall at home.

## 2021-05-23 NOTE — PROGRESS NOTE ADULT - ASSESSMENT
79 y/o F PMHx significant for GERD, recurrent UTIs, and osteoarthritis s/p mechanical fall now POD # 1 after R Hip Hemiarthroplasty    #Acute Right Femoral Neck Fracture   -s/p Right hip hemiarthroplasty on 5/22 with Ortho  - PT recommendations appreciated- ALBARO  ~cont. pain management per ortho  - AC team recs appreciated: Xarelto 10mg daily x 35 days starting 5/24 (last dose 6/26/21)      #Elevated Cardiac Markers  ~TnI => 0.098 -> 0.330 -> 0.240  ~etiologies include overall demand ischemia  ~patient denies any symptoms of chest pain/shortness of breath  - outpatient ischemic workup advised by cardiology  - D/C tele 5/23    #Left eye pain  - one time occurrence after surgery 5/22  - resolved after Lidocaine Optho drop  - concern for possible corneal abrasion  - visual fields intact  - will continue abx prophylaxis with erythromycin ointment  - patient advised to f/u with Optho for re-evaluation after discharge.     #hyponatremia in setting of hyperglycemia  - mild hyponatremia  - corrected Na 132  - may be due to poor po intake while NPO before procedure (patient received D5)  - f/u repeat BMP in AM    #Vte ppx  -Xarelto    #Dispo: D/C planning

## 2021-05-23 NOTE — PROGRESS NOTE ADULT - PROBLEM SELECTOR PLAN 2
Recommendation: Patient without complaints of chest pain., with minimal elevated troponin trending down with normal left ventricular function, most likely due to demand ischemia.  Continue statin 180.34 Recommendation: Patient without complaints of chest pain., with minimal elevated troponin trending down with normal left ventricular function, most likely due to demand ischemia.  Continue statin ecotrin , recommend to have OP ischemic work up

## 2021-05-23 NOTE — PROGRESS NOTE ADULT - SUBJECTIVE AND OBJECTIVE BOX
Patient seen and examined at bedside, resting comfortably. Pain well controlled. Denies headache, lightheadedness, dizziness, chest pain, dyspnea, n/v, numbness/tingling. No complaints at this time.       LABS:                        11.7   12.12 )-----------( 160      ( 22 May 2021 14:39 )             35.6         131<L>  |  101  |  6<L>  ----------------------------<  103<H>  3.8   |  24  |  0.39<L>    Ca    8.8      22 May 2021 14:39    TPro  x   /  Alb  3.4  /  TBili  x   /  DBili  x   /  AST  x   /  ALT  x   /  AlkPhos  x       PT/INR - ( 22 May 2021 05:30 )   PT: 12.7 sec;   INR: 1.10 ratio         PTT - ( 22 May 2021 05:30 )  PTT:28.2 sec  Urinalysis Basic - ( 21 May 2021 19:34 )    Color: Yellow / Appearance: Clear / S.010 / pH: x  Gluc: x / Ketone: Moderate  / Bili: Negative / Urobili: Negative mg/dL   Blood: x / Protein: Negative mg/dL / Nitrite: Negative   Leuk Esterase: Negative / RBC: x / WBC x   Sq Epi: x / Non Sq Epi: x / Bacteria: x          Vitals  T(C): 36.6 (21 @ 20:04), Max: 37.1 (21 @ 08:39)  HR: 61 (21 @ 20:04) (54 - 71)  BP: 113/48 (21 @ 20:04) (86/56 - 133/55)  RR: 17 (21 @ 20:04) (12 - 18)  SpO2: 100% (21 @ 20:04) (95% - 100%)      PHYSICAL EXAM  General: NAD, Awake and Alert    RIGHT Lower Extremity:  Dressing c/d/i  SCDs in place  L2-S1 SILT  +TA/EHL/FHL/GSC  + DP/PT pulses  Compartments soft and compressible  Calves nontender

## 2021-05-23 NOTE — PHYSICAL THERAPY INITIAL EVALUATION ADULT - RANGE OF MOTION EXAMINATION, REHAB EVAL
R hip WFL within THPs/bilateral upper extremity ROM was WFL (within functional limits)/bilateral lower extremity ROM was WFL (within functional limits)

## 2021-05-23 NOTE — PROGRESS NOTE ADULT - PROBLEM SELECTOR PLAN 5
Recommendation: moderate pulmonary HTN, h/o smoking in the past. CXR finding suggestive of COPD which will need to be followed as outpatient. Will follow.

## 2021-05-23 NOTE — PHYSICAL THERAPY INITIAL EVALUATION ADULT - MODALITIES TREATMENT COMMENTS
Pt educated on posterior hip precautions, falls risk reduction, therex review and the overall impact on the pt's ADLs and safety.

## 2021-05-23 NOTE — PROGRESS NOTE ADULT - PROBLEM SELECTOR PLAN 1
Patient status post right hip hemiarthroplasty.  Doing well. Afebrile.  H&H dropping. Daily CBC's.  Managed by ortho and hospitalist Patient status post right hip hemiarthroplasty.  Doing well. Afebrile.  H&H dropping.  monitor  CBC's.  Managed by ortho and hospitalist

## 2021-05-24 LAB
ANION GAP SERPL CALC-SCNC: 4 MMOL/L — LOW (ref 5–17)
BUN SERPL-MCNC: 10 MG/DL — SIGNIFICANT CHANGE UP (ref 7–23)
CALCIUM SERPL-MCNC: 8.3 MG/DL — LOW (ref 8.5–10.1)
CHLORIDE SERPL-SCNC: 102 MMOL/L — SIGNIFICANT CHANGE UP (ref 96–108)
CO2 SERPL-SCNC: 27 MMOL/L — SIGNIFICANT CHANGE UP (ref 22–31)
CREAT SERPL-MCNC: 0.4 MG/DL — LOW (ref 0.5–1.3)
GLUCOSE SERPL-MCNC: 108 MG/DL — HIGH (ref 70–99)
HCT VFR BLD CALC: 30 % — LOW (ref 34.5–45)
HGB BLD-MCNC: 10.1 G/DL — LOW (ref 11.5–15.5)
MCHC RBC-ENTMCNC: 31.6 PG — SIGNIFICANT CHANGE UP (ref 27–34)
MCHC RBC-ENTMCNC: 33.7 GM/DL — SIGNIFICANT CHANGE UP (ref 32–36)
MCV RBC AUTO: 93.8 FL — SIGNIFICANT CHANGE UP (ref 80–100)
PLATELET # BLD AUTO: 165 K/UL — SIGNIFICANT CHANGE UP (ref 150–400)
POTASSIUM SERPL-MCNC: 3.7 MMOL/L — SIGNIFICANT CHANGE UP (ref 3.5–5.3)
POTASSIUM SERPL-SCNC: 3.7 MMOL/L — SIGNIFICANT CHANGE UP (ref 3.5–5.3)
RBC # BLD: 3.2 M/UL — LOW (ref 3.8–5.2)
RBC # FLD: 12.9 % — SIGNIFICANT CHANGE UP (ref 10.3–14.5)
SODIUM SERPL-SCNC: 133 MMOL/L — LOW (ref 135–145)
WBC # BLD: 7.81 K/UL — SIGNIFICANT CHANGE UP (ref 3.8–10.5)
WBC # FLD AUTO: 7.81 K/UL — SIGNIFICANT CHANGE UP (ref 3.8–10.5)

## 2021-05-24 PROCEDURE — 99231 SBSQ HOSP IP/OBS SF/LOW 25: CPT

## 2021-05-24 PROCEDURE — 99239 HOSP IP/OBS DSCHRG MGMT >30: CPT | Mod: GC

## 2021-05-24 RX ORDER — ACETAMINOPHEN 500 MG
3 TABLET ORAL
Qty: 0 | Refills: 0 | DISCHARGE
Start: 2021-05-24

## 2021-05-24 RX ORDER — MAGNESIUM HYDROXIDE 400 MG/1
30 TABLET, CHEWABLE ORAL
Qty: 0 | Refills: 0 | DISCHARGE
Start: 2021-05-24

## 2021-05-24 RX ORDER — RIVAROXABAN 15 MG-20MG
1 KIT ORAL
Qty: 0 | Refills: 0 | DISCHARGE
Start: 2021-05-24

## 2021-05-24 RX ORDER — TRAMADOL HYDROCHLORIDE 50 MG/1
1 TABLET ORAL
Qty: 0 | Refills: 0 | DISCHARGE
Start: 2021-05-24

## 2021-05-24 RX ORDER — SENNA PLUS 8.6 MG/1
2 TABLET ORAL
Qty: 0 | Refills: 0 | DISCHARGE
Start: 2021-05-24

## 2021-05-24 RX ADMIN — Medication 975 MILLIGRAM(S): at 14:13

## 2021-05-24 RX ADMIN — Medication 975 MILLIGRAM(S): at 06:23

## 2021-05-24 RX ADMIN — Medication 12.5 MILLIGRAM(S): at 10:21

## 2021-05-24 RX ADMIN — OXYCODONE HYDROCHLORIDE 5 MILLIGRAM(S): 5 TABLET ORAL at 14:13

## 2021-05-24 RX ADMIN — Medication 975 MILLIGRAM(S): at 06:24

## 2021-05-24 RX ADMIN — TRAMADOL HYDROCHLORIDE 50 MILLIGRAM(S): 50 TABLET ORAL at 10:21

## 2021-05-24 RX ADMIN — OXYCODONE HYDROCHLORIDE 5 MILLIGRAM(S): 5 TABLET ORAL at 15:13

## 2021-05-24 RX ADMIN — RIVAROXABAN 10 MILLIGRAM(S): KIT at 10:21

## 2021-05-24 RX ADMIN — Medication 975 MILLIGRAM(S): at 22:00

## 2021-05-24 RX ADMIN — SENNA PLUS 2 TABLET(S): 8.6 TABLET ORAL at 22:00

## 2021-05-24 RX ADMIN — TRAMADOL HYDROCHLORIDE 50 MILLIGRAM(S): 50 TABLET ORAL at 11:21

## 2021-05-24 RX ADMIN — Medication 1 APPLICATION(S): at 00:05

## 2021-05-24 RX ADMIN — Medication 975 MILLIGRAM(S): at 15:13

## 2021-05-24 RX ADMIN — Medication 1 APPLICATION(S): at 06:23

## 2021-05-24 RX ADMIN — Medication 12.5 MILLIGRAM(S): at 22:00

## 2021-05-24 NOTE — PROGRESS NOTE ADULT - ASSESSMENT
Pt has been seen and examined with FP resident, resident supervised agree with a/p       Patient is a 80y old  Female who presents with a chief complaint of Fall at home with resultant right hip pain. (23 May 2021 09:52)      HPI:  79 y/o F PMHx significant for GERD, recurrent UTIs, and osteoarthritis who is a community ambulator via assistive devices (walker) presents to  for further evaluation and management of c/o severe right hip pain s/p unwitnessed fall at home.        PHYSICAL EXAM:    -rs-aeeb, cta  -cvs-s1s2 normal   -p/a-soft,bs+      A/P    #d/c today with further management as an outpt, time spent 45 minutes

## 2021-05-24 NOTE — PROGRESS NOTE ADULT - SUBJECTIVE AND OBJECTIVE BOX
81 YO F with a PMH of GERD, recurrent UTIs, and osteoarthritis who is a community ambulator via assistive devices (walker) presents to  for further evaluation and management of severe right hip pain after an  unwitnessed fall at home.  The patient states that she tripped and fell onto her right side while attempting to ambulate to her kitchen. She states that she was unable to bear weight after the fall. The patient was found down by her  who then notified EMS. She denies any associated prodrome of chest pain, palpitations, blurred vision, dizziness, or associated head trauma. Imaging in the ED revealed a right femoral neck fracture    SUBJECTIVE: Patient had Left eye pain, currently resolved. Patient wants to be discharged to rehabilitation tomorrow, as wants to work more with PT. Currently medically stable for discharge. COVID Swab ordered. DC in AM.     REVIEW OF SYSTEMS:  CONSTITUTIONAL: fevers or chills; mild fatigue  EYES/ENT: No visual changes;  No vertigo or throat pain , no eye pain  NECK: No pain or stiffness  RESPIRATORY: No cough, wheezing, hemoptysis; No shortness of breath  CARDIOVASCULAR: No chest pain or palpitations  GASTROINTESTINAL: No abdominal or epigastric pain. No nausea, vomiting, or hematemesis; No diarrhea or constipation. No melena or hematochezia.  GENITOURINARY: No dysuria, frequency or hematuria  NEUROLOGICAL: No numbness or weakness  SKIN: No itching, burning, rashes, or lesions       Vital Signs Last 24 Hrs  T(C): 37.1 (24 May 2021 08:20), Max: 37.3 (23 May 2021 19:57)  T(F): 98.8 (24 May 2021 08:20), Max: 99.2 (23 May 2021 19:57)  HR: 76 (24 May 2021 08:20) (73 - 82)  BP: 120/51 (24 May 2021 08:20) (120/51 - 145/45)  BP(mean): 81 (23 May 2021 16:34) (81 - 81)  RR: 18 (24 May 2021 08:20) (18 - 18)  SpO2: 98% (24 May 2021 08:20) (96% - 99%)    I&O's Summary    23 May 2021 07:01  -  24 May 2021 07:00  --------------------------------------------------------  IN: 0 mL / OUT: 200 mL / NET: -200 mL    CAPILLARY BLOOD GLUCOSE      POCT Blood Glucose.: 125 mg/dL (23 May 2021 23:46)    Physical Exam  Constitutional: resting, no acute distress  HEENT: EOMI, normal hearing, moist mucous membranes  Neck: Soft and supple, no JVD  Respiratory: CTABL, No wheezing, rales or rhonchi  Cardiovascular: S1S2+, RRR, no M/G/R  Gastrointestinal: BS+, soft, NT/ND, no guarding, no rebound  Extremities: No peripheral edema,dressing C/D/I Rt hip  Vascular: 2+ peripheral pulses  Neurological: AAOx3, no focal deficits  Skin: No rashes      MEDICATIONS  (STANDING):  acetaminophen   Tablet .. 975 milliGRAM(s) Oral every 8 hours  atorvastatin 40 milliGRAM(s) Oral at bedtime  erythromycin   Ointment 1 Application(s) Left EYE four times a day  metoprolol tartrate 12.5 milliGRAM(s) Oral every 12 hours  rivaroxaban 10 milliGRAM(s) Oral daily  senna 2 Tablet(s) Oral at bedtime    MEDICATIONS  (PRN):  magnesium hydroxide Suspension 30 milliLiter(s) Oral daily PRN Constipation  oxyCODONE    IR 2.5 milliGRAM(s) Oral every 4 hours PRN Moderate Pain (4 - 6)  oxyCODONE    IR 5 milliGRAM(s) Oral every 4 hours PRN Severe Pain (7 - 10)  polyethylene glycol 3350 17 Gram(s) Oral daily PRN Constipation  traMADol 50 milliGRAM(s) Oral every 4 hours PRN Mild Pain (1 - 3)      LABS: All Labs Reviewed:                                   10.1   7.81  )-----------( 165      ( 24 May 2021 06:57 )             30.0     05-24    133<L>  |  102  |  10  ----------------------------<  108<H>  3.7   |  27  |  0.40<L>    Ca    8.3<L>      24 May 2021 06:57         PTT - ( 22 May 2021 05:30 )  PTT:28.2 sec  CARDIAC MARKERS ( 22 May 2021 01:04 )  0.240 ng/mL / x     / x     / x     / x      CARDIAC MARKERS ( 21 May 2021 21:14 )  0.330 ng/mL / x     / x     / x     / x      CARDIAC MARKERS ( 21 May 2021 15:08 )  0.098 ng/mL / x     / 78 U/L / x     / x          < from: Xray Hip w/ Pelvis 1 View, Right (05.22.21 @ 15:24) >    IMPRESSION: Postoperative changes.      Thank you for this referral.            CR BURCH MD; Attending Interventional Radiologist  This document has been electronically signed. May 23 2021  2:10PM    < end of copied text >      < from: Xray Hip w/ Pelvis 2 or 3 Views, Right (05.21.21 @ 16:04) >  CLINICAL INFORMATION:  Injury withPain.    TECHNIQUE:   AP and oblique views of the hip were obtained. AP pelvis  AP lateral RIGHT femur radiographs  FINDINGS:   No prior exams are available for comparison.    There is a displaced superiorly displaced subcapital RIGHT femoral neck fracture with the femoral head remaining in acetabulum. Remaining osseous pelvis and distal RIGHT femur intact.    IMPRESSION:    Displaced RIGHT hip subcapital fracture deformity.    < end of copied text >    < from: Xray Femur 1 View, Right (05.21.21 @ 16:04) >  AP lateral RIGHT femur radiographs  FINDINGS:   No prior exams are available for comparison.    There is a displaced superiorly displaced subcapital RIGHT femoral neck fracture with the femoral head remaining in acetabulum. Remaining osseous pelvis and distal RIGHT femur intact.    IMPRESSION:    Displaced RIGHT hip subcapital fracture deformity.    < end of copied text >

## 2021-05-24 NOTE — PROGRESS NOTE ADULT - ASSESSMENT
This is an 80 year old female s/p fall sustaining right hip fracture now POD #1 s/p hemiarthroplasty with high risk for VTE due to age, BMI, impaired mobility, and surgery. She is low risk for bleeding.  Discussed Xarelto and the risks and benefits with patient. Emphasized the importance of taking medication daily to prevent VTE. Verbalized understanding and is in agreement with treatment plan.      Plan:  ::Xarelto 10 mg PO daily x 35 days total---> with last dose 6/26/21  ::PPI- Protonix 40 mg daily  ::Daily CBC/BMP  ::Enc. ambulation  ::Venherman    Will continue to follow.  Dispo: Rehab

## 2021-05-24 NOTE — PROGRESS NOTE ADULT - ASSESSMENT
Assessment:  80y Female s/p RIGHT Hip Hemiarthroplasty POD #2    -Pain control  -VTE ppx: per AC team   -WBAT/OOB with assistance as needed  -Posterior hip precautions  -Abduction pillow in place at all times while in bed or chair  -PT/OT  -Ice as needed  -Encourage incentive spirometry  -Medical management per primary team  -DC planning: [facility]  -Ortho stable for discharge to [facility].   -Will discuss with attending and will advise if plan changes.

## 2021-05-24 NOTE — PROGRESS NOTE ADULT - SUBJECTIVE AND OBJECTIVE BOX
HPI:  81 y/o F PMHx significant for GERD, recurrent UTIs, and osteoarthritis who is a community ambulator via assistive devices (walker) presents to  for further evaluation and management of c/o severe right hip pain s/p unwitnessed fall at home.  The patient states that she tripped and fell onto her right side while attempting to ambulate to her kitchen. She states that she was unable to bear weight after the all prompting today's visit to . The patient was found down by her  who then notified EMS. She denies any associated prodrome of chest pain, palpitations, blurred vision, dizziness, or associated head trauma. Imaging in the ED revealed a right femoral neck fracture    Patient is a 80y old  Female who presents with a chief complaint of right hip pain s/p right hip hemiarthroplasty 21.    Consulted by Dr. Taylor for VTE prophylaxis, risk stratification, and anticoagulation management.    PAST MEDICAL & SURGICAL HISTORY:  No pertinent past medical history    No significant past surgical history    Interval History:  21: Patient seen at bedside on [address] OOB to chair. She reports "Not so much painful but I feel so stiff." Made aware that she needs VTE prophylaxis and I recommend Xarelto. She is familiar with this medication as her  is on the full dose for atrial fibrillation. She denies any issues or concerns with the medication.  21: Patient seen at bedside OOB to chair. She states she did walk to the doorway and back with PT but "Just don't feel steady yet." She was advised she was leaving today but feels she needs one more day here in hospital with pain control and PT. Advised again of Xarelto as VTE prophylaxis. She reports understanding.    BMI: 25.8    CrCl:149.4    Caprini VTE Risk Score:  CAPRINI SCORE  AGE RELATED RISK FACTORS                                                       MOBILITY RELATED FACTORS  [ ] Age 41-60 years                                            (1 Point)                  [ ] Bed rest /restricted mobility                      (1 Point)  [ ] Age: 61-74 years                                           (2 Points)                [ ] Plaster cast                                                   (2 Points)  [ x] Age= 75 years                                              (3 Points)                 [ ] Bed bound for more than 72 hours                   (2 Points)    DISEASE RELATED RISK FACTORS                                               GENDER SPECIFIC FACTORS  [ ] Edema in the lower extremities                       (1 Point)           [ ] Pregnancy                                                            (1 Point)  [ ] Varicose veins                                               (1 Point)                  [ ] Post-partum < 6 weeks                                      (1 Point)             [x ] BMI > 25 Kg/m2                                            (1 Point)                  [ ] Hormonal therapy or oral contraception       (1 Point)                 [ ] Sepsis (in the previous month)                        (1 Point)             [ ] History of pregnancy complications                (1Point)  [ ] Pneumonia or serious lung disease                                             [ ] Unexplained or recurrent  (=/>3), premature                                 (In the previous month)                               (1 Point)                birth with toxemia or growth-restricted infant (1 Point)  [ ] Abnormal pulmonary function test            (1 Point)                                   SURGERY RELATED RISK FACTORS  [ ] Acute myocardial infarction                       (1 Point)                  [ ]  Section                                         (1 Point)  [ ] Congestive heart failure (in the previous month) (1 Point)   [ ] Minor surgery   lasting <45 minutes       (1 Point)   [ ] Inflammatory bowel disease                             (1 Point)          [ ] Arthroscopic surgery                                  (2 Points)  [ ] Central venous access                                    (2 Points)            [ ] General surgery lasting >45 minutes      (2 Points)       [ ] Stroke (in the previous month)                  (5 Points)            [ ] Elective major lower extremity arthroplasty (5 Points)                                   [  ] Malignancy (present or past include skin melanoma                                          but exclude  basal skin cell)    (2 points)                                      TRAUMA RELATED RISK FACTORS                HEMATOLOGY RELATED FACTORS                                  [x ] Fracture of the hip, pelvis, or leg                       (5 Points)  [ ] Prior episodes of VTE                                     (3 Points)          [ ] Acute spinal cord injury (in the previous month)  (5 Points)  [ ] Positive family history for VTE                         (3 Points)       [ ] Paralysis (less than 1 month)                          (5 Points)  [ ] Prothrombin 97950 A                                      (3 Points)         [ ] Multiple Trauma (within 1month)                 (5Points)                                                                                                                                                                [ ] Factor V Leiden                                          (3 Points)                                OTHER RISK FACTORS                          [ ] Lupus anticoagulants                                     (3 Points)                     [ ] BMI > 40                          (1 Point)                                                         [ ] Anticardiolipin antibodies                                (3 Points)                 [ ] Smoking                              (1Point)                                                [ ] High homocysteine in the blood                      (3 Points)                [  ] Diabetes requiring insulin (1point)                         [ ] Other congenital or acquired thrombophilia       (3 Points)          [  ] Chemotherapy                   (1 Point)  [ ] Heparin induced thrombocytopenia                  (3 Points)             [  ] Blood Transfusion                (1 point)                                                                                                             Total Score [     9     ]                                                                                                                                                                                                                                                                                                                                                                                                                                         IMPROVE Bleeding Risk Score:1.5      Falls Risk:   High (x  )  Mod (  )  Low (  )      FAMILY HISTORY:  No pertinent family history in first degree relatives      Denies any personal or familial history of clotting or bleeding disorders.    Allergies    penicillin (Unknown)    Intolerances        REVIEW OF SYSTEMS    (  )Fever	        (  )Constipation	(  )SOB				  (  )Headache   (  )Dysuria  (  )Chills	        (  )Melena	        (  )Dyspnea on exertion (  )Dizziness    (  )Polyuria  (  )Nausea      (  )Hematochezia	(  )Cough                          (  )Syncope      (  )Hematuria  (  )Vomiting   (  )Chest Pain	        (  )Wheezing			  (  )Weakness  (  )Diarrhea    (  )Palpitations	(  )Anorexia			  (x  )Myalgia       (  x ) Arthralgia    Pertinent positives in HPI and daily subjective. All other systems negative.    Vital Signs Last 24 Hrs  T(C): 37.1 (21 @ 08:20), Max: 37.3 (21 @ 19:57)  T(F): 98.8 (21 @ 08:20), Max: 99.2 (21 @ 19:57)  HR: 76 (21 @ 08:20) (73 - 82)  BP: 120/51 (21 @ 08:20) (120/51 - 145/45)  BP(mean): 81 (21 @ 16:34) (81 - 81)  RR: 18 (21 @ 08:20) (18 - 18)  SpO2: 98% (21 @ 08:20) (96% - 99%)      PHYSICAL EXAM:    Constitutional: Appears Well    Neurological: A& O x 3    Skin: Warm    Respiratory and Thorax: normal effort; Breath sounds: normal; No rales/wheezing/rhonchi  	  Cardiovascular: S1, S2, regular, NMBR	    Gastrointestinal: BS + x 4Q, nontender	    Genitourinary:  Bladder nondistended, nontender    Musculoskeletal:   General Right:   no muscle/joint tenderness,   normal tone, no joint swelling,   ROM: limited	    General Left:   no muscle/joint tenderness,   normal tone, no joint swelling,   ROM: full    Hip:  Right: Dressing CDI    Lower extrems:   Right: no calf tenderness              negative katharina's sign               + pedal pulses    Left:   no calf tenderness              negative katharina's sign               + pedal pulses                          10.1   7.81  )-----------( 165      ( 24 May 2021 06:57 )             30.0       05-24    133<L>  |  102  |  10  ----------------------------<  108<H>  3.7   |  27  |  0.40<L>    Ca    8.3<L>      24 May 2021 06:57                                10.6   8.49  )-----------( 163      ( 23 May 2021 07:18 )             31.0       05    132<L>  |  101  |  9   ----------------------------<  116<H>  3.6   |  25  |  0.43<L>    Ca    8.8      23 May 2021 07:18    TPro  x   /  Alb  3.4  /  TBili  x   /  DBili  x   /  AST  x   /  ALT  x   /  AlkPhos  x         PT/INR - ( 22 May 2021 05:30 )   PT: 12.7 sec;   INR: 1.10 ratio         PTT - ( 22 May 2021 05:30 )  PTT:28.2 sec				    MEDICATIONS  (STANDING):  acetaminophen   Tablet .. 975 milliGRAM(s) Oral every 8 hours  atorvastatin 40 milliGRAM(s) Oral at bedtime  erythromycin   Ointment 1 Application(s) Left EYE four times a day  metoprolol tartrate 12.5 milliGRAM(s) Oral every 12 hours  senna 2 Tablet(s) Oral at bedtime  sodium chloride 0.9%. 1000 milliLiter(s) IV Continuous <Continuous>              **Current DVT Prophylaxis:    LMWH                   (  )  Heparin SQ           (  )  Coumadin             (  )  Xarelto                  ( x )  Eliquis                   (  )  Venodynes           (x  )  Ambulation          ( x )  UFH                       (  )  ECASA                   (  )  Contraindicated  (  )

## 2021-05-24 NOTE — PROGRESS NOTE ADULT - SUBJECTIVE AND OBJECTIVE BOX
Orthopedics    Patient seen and examined at bedside, resting comfortably. Pain well controlled. Denies headache, lightheadedness, dizziness, chest pain, dyspnea, n/v, numbness/tingling. No complaints at this time.       Vital Signs Last 24 Hrs  T(C): 36.9 (24 May 2021 00:04), Max: 37.3 (23 May 2021 19:57)  T(F): 98.4 (24 May 2021 00:04), Max: 99.2 (23 May 2021 19:57)  HR: 73 (24 May 2021 00:04) (69 - 82)  BP: 145/45 (24 May 2021 00:04) (109/42 - 145/45)  BP(mean): 81 (23 May 2021 16:34) (81 - 81)  RR: 18 (24 May 2021 00:04) (18 - 18)  SpO2: 96% (24 May 2021 00:04) (94% - 99%)      PHYSICAL EXAM  General: NAD, Awake and Alert    RIGHT Lower Extremity:  Dressing c/d/i  SCDs in place  L2-S1 SILT  +TA/EHL/FHL/GSC  + DP/PT pulses  Compartments soft and compressible  Calves nontender

## 2021-05-24 NOTE — PROGRESS NOTE ADULT - ASSESSMENT
79 y/o F PMHx significant for GERD, recurrent UTIs, and osteoarthritis s/p mechanical fall now POD # 2 after R Hip Hemiarthroplasty    #Acute Right Femoral Neck Fracture  - s/p Right hip hemiarthroplasty on 5/22 with Ortho POD#2  - PT recommendations appreciated  - cont. pain management per ortho  - AC team recs appreciated: Xarelto 10mg daily x 35 days starting 5/24 (last dose 6/26/21)      #Elevated Cardiac Markers  - etiologies include overall demand ischemia  - patient denies any symptoms of chest pain/shortness of breath  - outpatient ischemic workup advised by cardiology      #Left eye pain  - one time occurrence after surgery 5/22  - resolved after Lidocaine Optho drop  - concern for possible corneal abrasion  - visual fields intact  - will continue abx prophylaxis with erythromycin ointment  - patient advised to f/u with Optho for re-evaluation after discharge.     #hyponatremia in setting of hyperglycemia  - mild hyponatremia  - corrected   - may be due to poor po intake while NPO before procedure (patient received D5)      #Vte ppx  -Xarelto    #Dispo: DC to [facility] in AM.

## 2021-05-25 ENCOUNTER — TRANSCRIPTION ENCOUNTER (OUTPATIENT)
Age: 81
End: 2021-05-25

## 2021-05-25 VITALS
DIASTOLIC BLOOD PRESSURE: 46 MMHG | HEART RATE: 65 BPM | SYSTOLIC BLOOD PRESSURE: 110 MMHG | TEMPERATURE: 98 F | RESPIRATION RATE: 18 BRPM | OXYGEN SATURATION: 100 %

## 2021-05-25 LAB
ANION GAP SERPL CALC-SCNC: 5 MMOL/L — SIGNIFICANT CHANGE UP (ref 5–17)
BUN SERPL-MCNC: 14 MG/DL — SIGNIFICANT CHANGE UP (ref 7–23)
CALCIUM SERPL-MCNC: 8.9 MG/DL — SIGNIFICANT CHANGE UP (ref 8.5–10.1)
CHLORIDE SERPL-SCNC: 103 MMOL/L — SIGNIFICANT CHANGE UP (ref 96–108)
CO2 SERPL-SCNC: 27 MMOL/L — SIGNIFICANT CHANGE UP (ref 22–31)
CREAT SERPL-MCNC: 0.4 MG/DL — LOW (ref 0.5–1.3)
GLUCOSE SERPL-MCNC: 102 MG/DL — HIGH (ref 70–99)
HCT VFR BLD CALC: 30.4 % — LOW (ref 34.5–45)
HGB BLD-MCNC: 9.8 G/DL — LOW (ref 11.5–15.5)
MCHC RBC-ENTMCNC: 31.1 PG — SIGNIFICANT CHANGE UP (ref 27–34)
MCHC RBC-ENTMCNC: 32.2 GM/DL — SIGNIFICANT CHANGE UP (ref 32–36)
MCV RBC AUTO: 96.5 FL — SIGNIFICANT CHANGE UP (ref 80–100)
PLATELET # BLD AUTO: 184 K/UL — SIGNIFICANT CHANGE UP (ref 150–400)
POTASSIUM SERPL-MCNC: 3.6 MMOL/L — SIGNIFICANT CHANGE UP (ref 3.5–5.3)
POTASSIUM SERPL-SCNC: 3.6 MMOL/L — SIGNIFICANT CHANGE UP (ref 3.5–5.3)
RBC # BLD: 3.15 M/UL — LOW (ref 3.8–5.2)
RBC # FLD: 12.9 % — SIGNIFICANT CHANGE UP (ref 10.3–14.5)
SARS-COV-2 RNA SPEC QL NAA+PROBE: SIGNIFICANT CHANGE UP
SODIUM SERPL-SCNC: 135 MMOL/L — SIGNIFICANT CHANGE UP (ref 135–145)
WBC # BLD: 7.97 K/UL — SIGNIFICANT CHANGE UP (ref 3.8–10.5)
WBC # FLD AUTO: 7.97 K/UL — SIGNIFICANT CHANGE UP (ref 3.8–10.5)

## 2021-05-25 PROCEDURE — 99232 SBSQ HOSP IP/OBS MODERATE 35: CPT

## 2021-05-25 PROCEDURE — 99233 SBSQ HOSP IP/OBS HIGH 50: CPT | Mod: GC

## 2021-05-25 PROCEDURE — 99231 SBSQ HOSP IP/OBS SF/LOW 25: CPT

## 2021-05-25 RX ADMIN — TRAMADOL HYDROCHLORIDE 50 MILLIGRAM(S): 50 TABLET ORAL at 12:37

## 2021-05-25 RX ADMIN — Medication 975 MILLIGRAM(S): at 14:00

## 2021-05-25 RX ADMIN — TRAMADOL HYDROCHLORIDE 50 MILLIGRAM(S): 50 TABLET ORAL at 10:37

## 2021-05-25 RX ADMIN — Medication 975 MILLIGRAM(S): at 05:18

## 2021-05-25 RX ADMIN — Medication 12.5 MILLIGRAM(S): at 10:38

## 2021-05-25 RX ADMIN — RIVAROXABAN 10 MILLIGRAM(S): KIT at 10:37

## 2021-05-25 NOTE — PROGRESS NOTE ADULT - PROVIDER SPECIALTY LIST ADULT
Orthopedics
Family Medicine
Family Medicine
Hospitalist
Orthopedics
Anticoag Management
Cardiology
Hospitalist
Orthopedics
Family Medicine
Orthopedics
Orthopedics
Anticoag Management
Family Medicine
Orthopedics

## 2021-05-25 NOTE — PROGRESS NOTE ADULT - SUBJECTIVE AND OBJECTIVE BOX
HPI:  81 y/o F PMHx significant for GERD, recurrent UTIs, and osteoarthritis who is a community ambulator via assistive devices (walker) presents to  for further evaluation and management of c/o severe right hip pain s/p unwitnessed fall at home.  The patient states that she tripped and fell onto her right side while attempting to ambulate to her kitchen. She states that she was unable to bear weight after the all prompting today's visit to . The patient was found down by her  who then notified EMS. She denies any associated prodrome of chest pain, palpitations, blurred vision, dizziness, or associated head trauma. Imaging in the ED revealed a right femoral neck fracture    Patient is a 80y old  Female who presents with a chief complaint of right hip pain s/p right hip hemiarthroplasty 21.    Consulted by Dr. Taylor for VTE prophylaxis, risk stratification, and anticoagulation management.    PAST MEDICAL & SURGICAL HISTORY:  No pertinent past medical history    No significant past surgical history    Interval History:  21: Patient seen at bedside on [address] OOB to chair. She reports "Not so much painful but I feel so stiff." Made aware that she needs VTE prophylaxis and I recommend Xarelto. She is familiar with this medication as her  is on the full dose for atrial fibrillation. She denies any issues or concerns with the medication.  21: Patient seen at bedside OOB to chair. She states she did walk to the doorway and back with PT but "Just don't feel steady yet." She was advised she was leaving today but feels she needs one more day here in hospital with pain control and PT. Advised again of Xarelto as VTE prophylaxis. She reports understanding.  21: Patient seen at bedside working with PT. Denies any issues with Xarelto, advised again of dosing and length of treatment.    BMI: 25.8    CrCl:149.4    Caprini VTE Risk Score:  CAPRINI SCORE  AGE RELATED RISK FACTORS                                                       MOBILITY RELATED FACTORS  [ ] Age 41-60 years                                            (1 Point)                  [ ] Bed rest /restricted mobility                      (1 Point)  [ ] Age: 61-74 years                                           (2 Points)                [ ] Plaster cast                                                   (2 Points)  [ x] Age= 75 years                                              (3 Points)                 [ ] Bed bound for more than 72 hours                   (2 Points)    DISEASE RELATED RISK FACTORS                                               GENDER SPECIFIC FACTORS  [ ] Edema in the lower extremities                       (1 Point)           [ ] Pregnancy                                                            (1 Point)  [ ] Varicose veins                                               (1 Point)                  [ ] Post-partum < 6 weeks                                      (1 Point)             [x ] BMI > 25 Kg/m2                                            (1 Point)                  [ ] Hormonal therapy or oral contraception       (1 Point)                 [ ] Sepsis (in the previous month)                        (1 Point)             [ ] History of pregnancy complications                (1Point)  [ ] Pneumonia or serious lung disease                                             [ ] Unexplained or recurrent  (=/>3), premature                                 (In the previous month)                               (1 Point)                birth with toxemia or growth-restricted infant (1 Point)  [ ] Abnormal pulmonary function test            (1 Point)                                   SURGERY RELATED RISK FACTORS  [ ] Acute myocardial infarction                       (1 Point)                  [ ]  Section                                         (1 Point)  [ ] Congestive heart failure (in the previous month) (1 Point)   [ ] Minor surgery   lasting <45 minutes       (1 Point)   [ ] Inflammatory bowel disease                             (1 Point)          [ ] Arthroscopic surgery                                  (2 Points)  [ ] Central venous access                                    (2 Points)            [ ] General surgery lasting >45 minutes      (2 Points)       [ ] Stroke (in the previous month)                  (5 Points)            [ ] Elective major lower extremity arthroplasty (5 Points)                                   [  ] Malignancy (present or past include skin melanoma                                          but exclude  basal skin cell)    (2 points)                                      TRAUMA RELATED RISK FACTORS                HEMATOLOGY RELATED FACTORS                                  [x ] Fracture of the hip, pelvis, or leg                       (5 Points)  [ ] Prior episodes of VTE                                     (3 Points)          [ ] Acute spinal cord injury (in the previous month)  (5 Points)  [ ] Positive family history for VTE                         (3 Points)       [ ] Paralysis (less than 1 month)                          (5 Points)  [ ] Prothrombin 33629 A                                      (3 Points)         [ ] Multiple Trauma (within 1month)                 (5Points)                                                                                                                                                                [ ] Factor V Leiden                                          (3 Points)                                OTHER RISK FACTORS                          [ ] Lupus anticoagulants                                     (3 Points)                     [ ] BMI > 40                          (1 Point)                                                         [ ] Anticardiolipin antibodies                                (3 Points)                 [ ] Smoking                              (1Point)                                                [ ] High homocysteine in the blood                      (3 Points)                [  ] Diabetes requiring insulin (1point)                         [ ] Other congenital or acquired thrombophilia       (3 Points)          [  ] Chemotherapy                   (1 Point)  [ ] Heparin induced thrombocytopenia                  (3 Points)             [  ] Blood Transfusion                (1 point)                                                                                                             Total Score [     9     ]                                                                                                                                                                                                                                                                                                                                                                                                                                         IMPROVE Bleeding Risk Score:1.5      Falls Risk:   High (x  )  Mod (  )  Low (  )      FAMILY HISTORY:  No pertinent family history in first degree relatives      Denies any personal or familial history of clotting or bleeding disorders.    Allergies    penicillin (Unknown)    Intolerances        REVIEW OF SYSTEMS    (  )Fever	        (  )Constipation	(  )SOB				  (  )Headache   (  )Dysuria  (  )Chills	        (  )Melena	        (  )Dyspnea on exertion (  )Dizziness    (  )Polyuria  (  )Nausea      (  )Hematochezia	(  )Cough                          (  )Syncope      (  )Hematuria  (  )Vomiting   (  )Chest Pain	        (  )Wheezing			  (  )Weakness  (  )Diarrhea    (  )Palpitations	(  )Anorexia			  (x  )Myalgia       (  x ) Arthralgia    Pertinent positives in HPI and daily subjective. All other systems negative.    Vital Signs Last 24 Hrs  T(C): 36.6 (21 @ 08:23), Max: 36.9 (21 @ 20:19)  T(F): 97.8 (21 @ 08:23), Max: 98.5 (21 @ 20:19)  HR: 65 (21 @ 08:23) (65 - 72)  BP: 110/46 (21 @ 08:23) (105/42 - 110/46)  BP(mean): --  RR: 18 (21 @ 08:23) (18 - 18)  SpO2: 100% (21 @ 08:23) (98% - 100%)      PHYSICAL EXAM:    Constitutional: Appears Well    Neurological: A& O x 3    Skin: Warm    Respiratory and Thorax: normal effort; Breath sounds: normal; No rales/wheezing/rhonchi  	  Cardiovascular: S1, S2, regular, NMBR	    Gastrointestinal: BS + x 4Q, nontender	    Genitourinary:  Bladder nondistended, nontender    Musculoskeletal:   General Right:   no muscle/joint tenderness,   normal tone, no joint swelling,   ROM: limited	    General Left:   no muscle/joint tenderness,   normal tone, no joint swelling,   ROM: full    Hip:  Right: Dressing CDI    Lower extrems:   Right: no calf tenderness              negative katharina's sign               + pedal pulses    Left:   no calf tenderness              negative katharina's sign               + pedal pulses                          9.8    7.97  )-----------( 184      ( 25 May 2021 07:28 )             30.4           135  |  103  |  14  ----------------------------<  102<H>  3.6   |  27  |  0.40<L>    Ca    8.9      25 May 2021 07:28                                10.1   7.81  )-----------( 165      ( 24 May 2021 06:57 )             30.0       05-24    133<L>  |  102  |  10  ----------------------------<  108<H>  3.7   |  27  |  0.40<L>    Ca    8.3<L>      24 May 2021 06:57                                10.6   8.49  )-----------( 163      ( 23 May 2021 07:18 )             31.0       05-23    132<L>  |  101  |  9   ----------------------------<  116<H>  3.6   |  25  |  0.43<L>    Ca    8.8      23 May 2021 07:18    TPro  x   /  Alb  3.4  /  TBili  x   /  DBili  x   /  AST  x   /  ALT  x   /  AlkPhos  x   05-22      PT/INR - ( 22 May 2021 05:30 )   PT: 12.7 sec;   INR: 1.10 ratio         PTT - ( 22 May 2021 05:30 )  PTT:28.2 sec				    MEDICATIONS  (STANDING):  acetaminophen   Tablet .. 975 milliGRAM(s) Oral every 8 hours  atorvastatin 40 milliGRAM(s) Oral at bedtime  erythromycin   Ointment 1 Application(s) Left EYE four times a day  metoprolol tartrate 12.5 milliGRAM(s) Oral every 12 hours  rivaroxaban 10 milliGRAM(s) Oral daily  senna 2 Tablet(s) Oral at bedtime    **Current DVT Prophylaxis:    LMWH                   (  )  Heparin SQ           (  )  Coumadin             (  )  Xarelto                  ( x )  Eliquis                   (  )  Venodynes           (x  )  Ambulation          ( x )  UFH                       (  )  ECASA                   (  )  Contraindicated  (  )

## 2021-05-25 NOTE — PROGRESS NOTE ADULT - ATTENDING COMMENTS
Patient is a 80y old  Female who presents with a chief complaint of Fall at home with resultant right hip pain. (25 May 2021 17:09)      MEDICATIONS  (STANDING):  acetaminophen   Tablet .. 975 milliGRAM(s) Oral every 8 hours  atorvastatin 40 milliGRAM(s) Oral at bedtime  erythromycin   Ointment 1 Application(s) Left EYE four times a day  metoprolol tartrate 12.5 milliGRAM(s) Oral every 12 hours  rivaroxaban 10 milliGRAM(s) Oral daily  senna 2 Tablet(s) Oral at bedtime    MEDICATIONS  (PRN):  magnesium hydroxide Suspension 30 milliLiter(s) Oral daily PRN Constipation  oxyCODONE    IR 2.5 milliGRAM(s) Oral every 4 hours PRN Moderate Pain (4 - 6)  oxyCODONE    IR 5 milliGRAM(s) Oral every 4 hours PRN Severe Pain (7 - 10)  polyethylene glycol 3350 17 Gram(s) Oral daily PRN Constipation  traMADol 50 milliGRAM(s) Oral every 4 hours PRN Mild Pain (1 - 3)      CAPILLARY BLOOD GLUCOSE          PHYSICAL EXAM:    GENERAL: NAD, lying in bed comfortably, alert and oriented  HEAD:  Atraumatic, Normocephalic, NECK: Supple, No JVD  EYES: EOMI, PERRL, conjunctiva and sclera clear, ENT: Moist mucous membranes  CHEST/LUNG: Clear to auscultation bilaterally; No rales, rhonchi, wheezing, or rubs. Unlabored respirations  HEART: Regular rate and rhythm; No murmurs, rubs, or gallops  ABDOMEN: Bowel sounds present; Soft, Nontender, Nondistended. No hepatomegally  EXTREMITIES:  2+ Peripheral Pulses, brisk capillary refill. No clubbing, cyanosis, or edema  NERVOUS SYSTEM:  No focal deficits   MSK: S/P R hip hemiarthroplasty    LABS: Reviewed with resident as documented above    RADIOLOGY & ADDITIONAL TESTS:    Imaging Personally Reviewed:    Consultant(s) Notes Reviewed:      Care Discussed with Consultants/Other Providers:      Patient was seen on rounds, interviewed and examined with Dr. Barraza. Medical Record reviewed. History, review of systems, physical findings and lab results as documented confirmed, except as modified by me. Agree with management plan as described except as modified below.    Plan discharge to [facility].
-rs-aeeb, cta  -p/a-soft, bs+  -cvs-s1s2 normal     A/P    #ct pain control, watch sodium ion, give NS infusion and not dextrose water during surgery    #pt is medically opitmized for planned procedure

## 2021-05-25 NOTE — PROGRESS NOTE ADULT - REASON FOR ADMISSION
Fall at home with resultant right hip pain.

## 2021-05-25 NOTE — DISCHARGE NOTE NURSING/CASE MANAGEMENT/SOCIAL WORK - PATIENT PORTAL LINK FT
You can access the FollowMyHealth Patient Portal offered by [facility] by registering at the following website: [email or web address]. By joining [facility]’s FollowMyHealth portal, you will also be able to view your health information using other applications (apps) compatible with our system.

## 2021-05-25 NOTE — PROGRESS NOTE ADULT - ASSESSMENT
`81 y/o F PMHx significant for GERD, recurrent UTIs, and osteoarthritis s/p mechanical fall now POD # 3 after R Hip Hemiarthroplasty    #Acute Right Femoral Neck Fracture  - s/p Right hip hemiarthroplasty on 5/22 with Ortho POD#3  - PT recommendations appreciated - [facility]  - cont. pain management per ortho  - AC team recs appreciated: Xarelto 10mg daily x 35 days starting 5/24 (last dose 6/26/21)      #Elevated Cardiac Markers  - etiologies include overall demand ischemia  - patient denies any symptoms of chest pain/shortness of breath  - outpatient ischemic workup advised by cardiology      #Left eye pain- resolved  - one time occurrence after surgery 5/22  - resolved after Lidocaine Optho drop  - patient advised to f/u with Optho for re-evaluation after discharge.      #Vte ppx  -Xarelto    #Dispo: DC to [facility] today

## 2021-05-25 NOTE — PROVIDER CONTACT NOTE (OTHER) - SITUATION
answering service aware of consult.
Faxed paperwork to Dr. Johnathan Sykes
faxed discharge paperwork to Dr. Johnathan Sykes

## 2021-05-25 NOTE — PROGRESS NOTE ADULT - SUBJECTIVE AND OBJECTIVE BOX
Orthopedics     POD 2 Hip Hemiarthroplasty  Pain is controlled. Pt feeling well. No nausea or vomiting. Was up OOB with PT.    Vital Signs Last 24 Hrs  T(C): 36.6 (05-25-21 @ 08:23), Max: 36.9 (05-24-21 @ 20:19)  T(F): 97.8 (05-25-21 @ 08:23), Max: 98.5 (05-24-21 @ 20:19)  HR: 65 (05-25-21 @ 08:23) (65 - 72)  BP: 110/46 (05-25-21 @ 08:23) (105/42 - 110/46)  BP(mean): --  RR: 18 (05-25-21 @ 08:23) (18 - 18)  SpO2: 100% (05-25-21 @ 08:23) (98% - 100%)                        9.8    7.97  )-----------( 184      ( 25 May 2021 07:28 )             30.4     25 May 2021 07:28    135    |  103    |  14     ----------------------------<  102    3.6     |  27     |  0.40     Ca    8.9        25 May 2021 07:28        Exam:  NAD AAOx3  Dressing mildly stained  +EHL FHL TA GS  SILT toes 1-5  +DP  Calf Soft NT    A/P:  Stable POD 2 RIGHT Hip Hemiarthroplasty  -New FEDER[place] applied using sterile gloves  -Analgesia  -Ppx ABX  -DVT PE ppx  -OOB PT posterior dislocation precautions  -DC for today, Dr Taylor on board

## 2021-05-25 NOTE — PROGRESS NOTE ADULT - NSICDXPILOT_GEN_ALL_CORE
[place]

## 2021-05-25 NOTE — PROGRESS NOTE ADULT - ASSESSMENT
Assessment:  80y Female s/p RIGHT Hip Hemiarthroplasty POD #3    -dressing change  -Pain control  -VTE ppx: per AC team   -WBAT/OOB with assistance as needed  -Posterior hip precautions  -Abduction pillow in place at all times while in bed or chair  -PT/OT  -Ice as needed  -Encourage incentive spirometry  -Medical management per primary team  -DC planning: [facility]  -Ortho stable for discharge to [facility].   -Will discuss with attending and will advise if plan changes.

## 2021-05-25 NOTE — PROGRESS NOTE ADULT - SUBJECTIVE AND OBJECTIVE BOX
`CHIEF COMPLAINT:    SUBJECTIVE:     REVIEW OF SYSTEMS:  CONSTITUTIONAL: No weakness, fevers or chills  EYES/ENT: No visual changes;  No vertigo or throat pain   NECK: No pain or stiffness  RESPIRATORY: No cough, wheezing, hemoptysis; No shortness of breath  CARDIOVASCULAR: No chest pain or palpitations  GASTROINTESTINAL: No abdominal or epigastric pain. No nausea, vomiting, or hematemesis; No diarrhea or constipation. No melena or hematochezia.  GENITOURINARY: No dysuria, frequency or hematuria  NEUROLOGICAL: No numbness or weakness  SKIN: No itching, burning, rashes, or lesions   All other review of systems is negative unless indicated above    Vital Signs Last 24 Hrs  T(C): 36.6 (25 May 2021 08:23), Max: 36.9 (24 May 2021 20:19)  T(F): 97.8 (25 May 2021 08:23), Max: 98.5 (24 May 2021 20:19)  HR: 65 (25 May 2021 08:23) (65 - 72)  BP: 110/46 (25 May 2021 08:23) (105/42 - 110/46)  BP(mean): --  RR: 18 (25 May 2021 08:23) (18 - 18)  SpO2: 100% (25 May 2021 08:23) (98% - 100%)    I&O's Summary    24 May 2021 07:01  -  25 May 2021 07:00  --------------------------------------------------------  IN: 0 mL / OUT: 450 mL / NET: -450 mL        CAPILLARY BLOOD GLUCOSE          PHYSICAL EXAM:  Constitutional: NAD, awake and alert, well-developed  HEENT: PERR, EOMI, Normal Hearing, MMM  Neck: Soft and supple, No LAD, No JVD  Respiratory: Breath sounds are clear bilaterally, No wheezing, rales or rhonchi  Cardiovascular: S1 and S2, regular rate and rhythm, no Murmurs, gallops or rubs  Gastrointestinal: Bowel Sounds present, soft, nontender, nondistended, no guarding, no rebound  Extremities: No peripheral edema  Vascular: 2+ peripheral pulses  Neurological: A/O x 3, no focal deficits  Musculoskeletal: 5/5 strength b/l upper and lower extremities  Skin: No rashes    MEDICATIONS:  MEDICATIONS  (STANDING):  acetaminophen   Tablet .. 975 milliGRAM(s) Oral every 8 hours  atorvastatin 40 milliGRAM(s) Oral at bedtime  erythromycin   Ointment 1 Application(s) Left EYE four times a day  metoprolol tartrate 12.5 milliGRAM(s) Oral every 12 hours  rivaroxaban 10 milliGRAM(s) Oral daily  senna 2 Tablet(s) Oral at bedtime    MEDICATIONS  (PRN):  magnesium hydroxide Suspension 30 milliLiter(s) Oral daily PRN Constipation  oxyCODONE    IR 2.5 milliGRAM(s) Oral every 4 hours PRN Moderate Pain (4 - 6)  oxyCODONE    IR 5 milliGRAM(s) Oral every 4 hours PRN Severe Pain (7 - 10)  polyethylene glycol 3350 17 Gram(s) Oral daily PRN Constipation  traMADol 50 milliGRAM(s) Oral every 4 hours PRN Mild Pain (1 - 3)      LABS: All Labs Reviewed:                        9.8    7.97  )-----------( 184      ( 25 May 2021 07:28 )             30.4     05-25    135  |  103  |  14  ----------------------------<  102<H>  3.6   |  27  |  0.40<L>    Ca    8.9      25 May 2021 07:28            Blood Culture:     RADIOLOGY/EKG:    DVT PPX:    ADVANCED DIRECTIVE:    DISPOSITION: 79 YO F with a PMH of GERD, recurrent UTIs, and osteoarthritis who is a community ambulator via assistive devices (walker) presents to  for further evaluation and management of severe right hip pain after an  unwitnessed fall at home.  The patient states that she tripped and fell onto her right side while attempting to ambulate to her kitchen. She states that she was unable to bear weight after the fall. The patient was found down by her  who then notified EMS. She denies any associated prodrome of chest pain, palpitations, blurred vision, dizziness, or associated head trauma. Imaging in the ED revealed a right femoral neck fracture    SUBJECTIVE: This morning patient complains of mild stiffness in her hip when moving with PT, however states that her pain is controlled with tramadol and tylenol. Denies any lightheaddedness with movement. Patient is hopeful that rehab will help her regain her strength and mobility. understands that she is scheduled for discharge today.     REVIEW OF SYSTEMS:  CONSTITUTIONAL: No weakness, fevers or chills  EYES/ENT: No eye pain   RESPIRATORY: No cough, wheezing, hemoptysis; No shortness of breath  CARDIOVASCULAR: No chest pain or palpitations  GASTROINTESTINAL: No abdominal or epigastric pain. No nausea, vomiting or constipation.  GENITOURINARY: No dysuria, frequency or hematuria  MSK: Mild hip stiffness  All other review of systems is negative unless indicated above    Vital Signs Last 24 Hrs  T(C): 36.6 (25 May 2021 08:23), Max: 36.9 (24 May 2021 20:19)  T(F): 97.8 (25 May 2021 08:23), Max: 98.5 (24 May 2021 20:19)  HR: 65 (25 May 2021 08:23) (65 - 72)  BP: 110/46 (25 May 2021 08:23) (105/42 - 110/46)  RR: 18 (25 May 2021 08:23) (18 - 18)  SpO2: 100% (25 May 2021 08:23) (98% - 100%)    I&O's Summary    24 May 2021 07:01  -  25 May 2021 07:00  --------------------------------------------------------  IN: 0 mL / OUT: 450 mL / NET: -450 mL      PHYSICAL EXAM:  Constitutional: NAD, awake and alert, lying in bed  HEENT: PERR, EOMI, Normal Hearing  Respiratory: Breath sounds are clear bilaterally, No wheezing, rales or rhonchi  Cardiovascular: S1 and S2, 2/6 BRENNON,  regular rate and rhythm  Gastrointestinal: Bowel Sounds present, soft, nontender, nondistended,  Vascular: 2+ peripheral pulses  Extremities: Rt hip dressing C/D/I; no calf tenderness b/l     MEDICATIONS:  MEDICATIONS  (STANDING):  acetaminophen   Tablet .. 975 milliGRAM(s) Oral every 8 hours  atorvastatin 40 milliGRAM(s) Oral at bedtime  erythromycin   Ointment 1 Application(s) Left EYE four times a day  metoprolol tartrate 12.5 milliGRAM(s) Oral every 12 hours  rivaroxaban 10 milliGRAM(s) Oral daily  senna 2 Tablet(s) Oral at bedtime    MEDICATIONS  (PRN):  magnesium hydroxide Suspension 30 milliLiter(s) Oral daily PRN Constipation  oxyCODONE    IR 2.5 milliGRAM(s) Oral every 4 hours PRN Moderate Pain (4 - 6)  oxyCODONE    IR 5 milliGRAM(s) Oral every 4 hours PRN Severe Pain (7 - 10)  polyethylene glycol 3350 17 Gram(s) Oral daily PRN Constipation  traMADol 50 milliGRAM(s) Oral every 4 hours PRN Mild Pain (1 - 3)      LABS: All Labs Reviewed:                        9.8    7.97  )-----------( 184      ( 25 May 2021 07:28 )             30.4     05-25    135  |  103  |  14  ----------------------------<  102<H>  3.6   |  27  |  0.40<L>    Ca    8.9      25 May 2021 07:28            Blood Culture:     RADIOLOGY/EKG:    DVT PPX:    ADVANCED DIRECTIVE:    DISPOSITION:

## 2021-05-25 NOTE — PROGRESS NOTE ADULT - SUBJECTIVE AND OBJECTIVE BOX
Orthopedics    Patient seen and examined at bedside, resting comfortably. Pain well controlled. Denies headache, lightheadedness, dizziness, chest pain, dyspnea, n/v, numbness/tingling. No complaints at this time.       Vital Signs Last 24 Hrs  T(C): 36.9 (25 May 2021 00:25), Max: 37.1 (24 May 2021 08:20)  T(F): 98.4 (25 May 2021 00:25), Max: 98.8 (24 May 2021 08:20)  HR: 71 (25 May 2021 00:25) (71 - 76)  BP: 105/42 (25 May 2021 00:25) (105/42 - 120/51)  BP(mean): --  RR: 18 (25 May 2021 00:25) (18 - 18)  SpO2: 98% (25 May 2021 00:25) (98% - 99%)    PHYSICAL EXAM  General: NAD, Awake and Alert    RIGHT Lower Extremity:  Dressing with drainage.   SCDs in place  L2-S1 SILT  +TA/EHL/FHL/GSC  + DP/PT pulses  Compartments soft and compressible  Calves nontender

## 2021-06-07 DIAGNOSIS — R73.9 HYPERGLYCEMIA, UNSPECIFIED: ICD-10-CM

## 2021-06-07 DIAGNOSIS — I35.0 NONRHEUMATIC AORTIC (VALVE) STENOSIS: ICD-10-CM

## 2021-06-07 DIAGNOSIS — I24.8 OTHER FORMS OF ACUTE ISCHEMIC HEART DISEASE: ICD-10-CM

## 2021-06-07 DIAGNOSIS — Z88.0 ALLERGY STATUS TO PENICILLIN: ICD-10-CM

## 2021-06-07 DIAGNOSIS — M19.90 UNSPECIFIED OSTEOARTHRITIS, UNSPECIFIED SITE: ICD-10-CM

## 2021-06-07 DIAGNOSIS — R26.81 UNSTEADINESS ON FEET: ICD-10-CM
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
## 2021-06-07 DIAGNOSIS — R71.0 PRECIPITOUS DROP IN HEMATOCRIT: ICD-10-CM

## 2021-06-07 DIAGNOSIS — H57.12 OCULAR PAIN, LEFT EYE: ICD-10-CM

## 2021-06-07 DIAGNOSIS — I27.20 PULMONARY HYPERTENSION, UNSPECIFIED: ICD-10-CM

## 2021-06-07 DIAGNOSIS — R77.8 OTHER SPECIFIED ABNORMALITIES OF PLASMA PROTEINS: ICD-10-CM

## 2021-06-07 DIAGNOSIS — E78.5 HYPERLIPIDEMIA, UNSPECIFIED: ICD-10-CM

## 2021-06-07 DIAGNOSIS — G89.18 OTHER ACUTE POSTPROCEDURAL PAIN: ICD-10-CM

## 2021-06-07 DIAGNOSIS — S72.001A FRACTURE OF UNSPECIFIED PART OF NECK OF RIGHT FEMUR, INITIAL ENCOUNTER FOR CLOSED FRACTURE: ICD-10-CM

## 2021-06-07 DIAGNOSIS — E87.1 HYPO-OSMOLALITY AND HYPONATREMIA: ICD-10-CM

## 2021-07-30 ENCOUNTER — APPOINTMENT (OUTPATIENT)
Dept: INTERNAL MEDICINE | Facility: CLINIC | Age: 81
End: 2021-07-30
Payer: MEDICARE

## 2021-07-30 VITALS — HEIGHT: 65 IN

## 2021-07-30 VITALS — SYSTOLIC BLOOD PRESSURE: 130 MMHG | DIASTOLIC BLOOD PRESSURE: 80 MMHG

## 2021-07-30 DIAGNOSIS — S72.001A FRACTURE OF UNSPECIFIED PART OF NECK OF RIGHT FEMUR, INITIAL ENCOUNTER FOR CLOSED FRACTURE: ICD-10-CM

## 2021-07-30 PROBLEM — Z78.9 OTHER SPECIFIED HEALTH STATUS: Chronic | Status: ACTIVE | Noted: 2021-05-21

## 2021-07-30 PROCEDURE — 99213 OFFICE O/P EST LOW 20 MIN: CPT

## 2021-07-30 RX ORDER — DICYCLOMINE HYDROCHLORIDE 10 MG/1
10 CAPSULE ORAL 3 TIMES DAILY
Qty: 30 | Refills: 0 | Status: ACTIVE | COMMUNITY
Start: 2021-07-30 | End: 1900-01-01

## 2021-07-30 NOTE — ASSESSMENT
[FreeTextEntry1] : Irritable bowel/diarrhea–she is having 2 loose bowel movements per day.  She is also having some cramping and is given Bentyl 10 mg to use as needed 3 times daily.  She is going to continue with Imodium.  She has been taking 2 in the morning and was told she can continue that and also take an additional 1 in the afternoon if needed.  She knows to watch her diet in terms of avoiding fatty foods especially.  She will follow-up if there is any significant change or worsening.\par \par Status post hip fracture/repair–she is doing well and will be starting home physical therapy.  She has follow-up scheduled with her orthopedist in the next couple of weeks.\par \par Hyperlipidemia–she had been on Mevacor prior to hospitalization and while she was in rehab she was switched to Lipitor.  She was told she can continue with either 1 but not both.

## 2021-07-30 NOTE — PHYSICAL EXAM
[No Acute Distress] : no acute distress [Clear to Auscultation] : lungs were clear to auscultation bilaterally [Normal Rate] : normal rate  [Regular Rhythm] : with a regular rhythm [No Edema] : there was no peripheral edema [Soft] : abdomen soft [Non Tender] : non-tender

## 2021-07-30 NOTE — REVIEW OF SYSTEMS
[Nausea] : nausea [Diarrhea] : diarrhea [Fever] : no fever [Chills] : no chills [Chest Pain] : no chest pain [Shortness Of Breath] : no shortness of breath [Abdominal Pain] : no abdominal pain [Vomiting] : no vomiting [Headache] : no headache [Dizziness] : no dizziness

## 2021-07-30 NOTE — HISTORY OF PRESENT ILLNESS
[de-identified] : 79 y/o presents complaining of some increased IBS related symptoms over the past week.  She has a previous history of irritable bowel and generally uses Imodium for symptoms.  She was recently hospitalized after a right hip fracture that was repaired with a partial replacement.  He has been home from rehab for about 3 weeks.  She denies any fever or chills or any vomiting or blood.

## 2021-07-30 NOTE — HEALTH RISK ASSESSMENT
[No] : In the past 12 months have you used drugs other than those required for medical reasons? No [Any fall with injury in past year] : Patient reported fall with injury in the past year [0] : 2) Feeling down, depressed, or hopeless: Not at all (0) [PHQ-2 Negative - No further assessment needed] : PHQ-2 Negative - No further assessment needed [] : No [de-identified] : Right hip fracture

## 2021-09-24 NOTE — ED PROVIDER NOTE - OBJECTIVE STATEMENT
Addended by: Sherron Puga on: 9/24/2021 02:53 PM     Modules accepted: Orders 79 y/o female with pmhx of GERD, UTI, OA presents to the ED s/p unwitnessed fall c/o R hip pain. Pt states she was in the kitchen, she turned around, lost her balance and fell landing on her right hip. Pt states she was unable to stand after fall. Pt was able to maker her way to the den.  found the pt on the floor approx. 30 min after fall occurred. Denies the use of anticoagulants. Denies head strike, LOC. No other complaints at this time.

## 2022-08-17 ENCOUNTER — APPOINTMENT (OUTPATIENT)
Dept: INTERNAL MEDICINE | Facility: CLINIC | Age: 82
End: 2022-08-17

## 2022-08-17 VITALS
SYSTOLIC BLOOD PRESSURE: 134 MMHG | BODY MASS INDEX: 25.99 KG/M2 | WEIGHT: 156 LBS | HEIGHT: 65 IN | DIASTOLIC BLOOD PRESSURE: 84 MMHG

## 2022-08-17 DIAGNOSIS — R60.9 EDEMA, UNSPECIFIED: ICD-10-CM

## 2022-08-17 DIAGNOSIS — G89.29 DORSALGIA, UNSPECIFIED: ICD-10-CM

## 2022-08-17 DIAGNOSIS — M54.9 DORSALGIA, UNSPECIFIED: ICD-10-CM

## 2022-08-17 DIAGNOSIS — E78.5 HYPERLIPIDEMIA, UNSPECIFIED: ICD-10-CM

## 2022-08-17 DIAGNOSIS — I73.9 PERIPHERAL VASCULAR DISEASE, UNSPECIFIED: ICD-10-CM

## 2022-08-17 DIAGNOSIS — K58.9 IRRITABLE BOWEL SYNDROME W/OUT DIARRHEA: ICD-10-CM

## 2022-08-17 PROCEDURE — 99214 OFFICE O/P EST MOD 30 MIN: CPT

## 2022-08-17 NOTE — PHYSICAL EXAM
[No Acute Distress] : no acute distress [No JVD] : no jugular venous distention [No Respiratory Distress] : no respiratory distress  [Clear to Auscultation] : lungs were clear to auscultation bilaterally [Normal Rate] : normal rate  [Regular Rhythm] : with a regular rhythm [de-identified] : 2/6 SM [de-identified] : Trace edema on the left.  Calves are nontender without swelling.

## 2022-08-17 NOTE — HEALTH RISK ASSESSMENT
[Never] : Never [No] : In the past 12 months have you used drugs other than those required for medical reasons? No [No falls in past year] : Patient reported no falls in the past year [0] : 2) Feeling down, depressed, or hopeless: Not at all (0) [PHQ-2 Negative - No further assessment needed] : PHQ-2 Negative - No further assessment needed [[id number]] : 0

## 2022-08-17 NOTE — HISTORY OF PRESENT ILLNESS
[de-identified] : 81-year-old female presents for follow-up of her IBS symptoms as well as ongoing problems with back pain/arthritis.  She also needs to have her handicap parking sticker renewed.  Her IBS symptoms have been somewhat increased recently but she does manage with Imodium on an as-needed basis.  Also does complain of occasional swelling in the lower legs.  She had seen a cardiologist in the past and was given a diuretic to use as needed.  Also has a history of hyperlipidemia and follows regularly with  Which she lives in [place].

## 2022-08-17 NOTE — REVIEW OF SYSTEMS
[Fever] : no fever [Chest Pain] : no chest pain [Palpitations] : no palpitations [Shortness Of Breath] : no shortness of breath [Dyspnea on Exertion] : no dyspnea on exertion [Abdominal Pain] : no abdominal pain [Diarrhea] : diarrhea [Joint Pain] : joint pain [Back Pain] : back pain [Headache] : no headache [Dizziness] : no dizziness

## 2022-08-17 NOTE — ASSESSMENT
[FreeTextEntry1] : History of irritable bowel–symptoms are intermittent and she manages them with Imodium on an as-needed basis.  She also knows to follow a light diet when it seems to be bothering her.\par \par History of PVD/varicose veins/edema–she has trace edema on the left but the rest exam is unremarkable.  She still has some diuretic left from the cardiologist but she has not used it recently.\par \par Chronic back pain/arthritis–there have been no significant changes in his symptoms.  She walks with the use of a walker for balance.  Her disabled parking permit form was completed for renewal.\par \par History of hyperlipidemia–she follows with a doctor in [place] and is presently on Mevacor 20 mg daily.

## 2022-09-02 ENCOUNTER — APPOINTMENT (OUTPATIENT)
Dept: INTERNAL MEDICINE | Facility: CLINIC | Age: 82
End: 2022-09-02

## 2022-09-02 VITALS — DIASTOLIC BLOOD PRESSURE: 82 MMHG | SYSTOLIC BLOOD PRESSURE: 118 MMHG

## 2022-09-02 DIAGNOSIS — B36.9 SUPERFICIAL MYCOSIS, UNSPECIFIED: ICD-10-CM

## 2022-09-02 PROCEDURE — 99213 OFFICE O/P EST LOW 20 MIN: CPT

## 2022-09-02 RX ORDER — CLOTRIMAZOLE AND BETAMETHASONE DIPROPIONATE 10; .5 MG/G; MG/G
1-0.05 CREAM TOPICAL
Qty: 1 | Refills: 1 | Status: ACTIVE | COMMUNITY
Start: 2022-09-02 | End: 1900-01-01

## 2022-09-02 NOTE — HISTORY OF PRESENT ILLNESS
[FreeTextEntry8] : She presents complaining of recurrence of an itchy rash on the right side of her neck.  She has had this in the past and has seen a dermatologist in [place].  She was given 2 creams at that time but is not sure what they were.  She has been using clotrimazole with some relief.

## 2022-09-02 NOTE — PHYSICAL EXAM
[No Acute Distress] : no acute distress [de-identified] : Right upper shoulder area, right side of neck and posterior shoulder with multiple areas of flat, circumscribed slightly erythematous lesions.

## 2022-09-02 NOTE — ASSESSMENT
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  She has been using clotrimazole cream with less than adequate response.\par Given Lotrisone cream to apply twice daily for the next 10-14 days until it has completely resolved.

## 2023-12-22 NOTE — H&P ADULT - NSHPOUTPATIENTPROVIDERS_GEN_ALL_CORE
"Patient Name: Rekha Bear  : 1942    MRN: [id number]                              Today's Date: 2023       Admit Date: 2023    Visit Dx:     ICD-10-CM ICD-9-CM   1. Acute ischemic left PCA stroke  I63.532 434.91   2. Generalized weakness  R53.1 780.79   3. Confusion  R41.0 298.9   4. Hypertension, unspecified type  I10 401.9   5. History of hypertension  Z86.79 V12.59   6. 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    Community acquired pneumonia of left lower lobe of lung    Hyponatremia    Pneumonia due to COVID-19 virus    Major depressive disorder, recurrent, mild    Acute respiratory failure with hypoxia    Supraventricular tachycardia    PVC (premature ventricular contraction)    Dyspnea on exertion    Dizziness    Depressive disorder    Gastroesophageal reflux disease    Midline cystocele    Rectocele    BMI 29.0-29.9,adult    Acute left PCA stroke     Past Medical History:   Diagnosis Date    Acute bronchitis     Chest pain     Chilblains     \"Chilblian's\"    Depression     Fatty liver     GERD (gastroesophageal reflux disease)     Hyperlipidemia     Hypertension     Incontinence     Intermittent lightheadedness     Osteoarthritis     OA  Marvin THR, LT TKR - hydrocodone prescibed by Dr. Almaguer    Pain of esophagus     \" nervous esophagus\" - she takes the occasional alprazolam    Peptic ulcer disease     Pneumonia due to COVID-19 virus 2020    now tested negative    Raynaud's disease     \"Raynauds\"    Renal " disease     followed by Dr. 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 Almaguer    TOTAL KNEE ARTHROPLASTY Left     OA  Marvin THR, LT TKR - hydrocodone prescibed by Dr. Almaguer      General Information       Row Name 12/22/23 1213          OT Time and Intention    Document Type evaluation  -RB     Mode of Treatment co-treatment;physical therapy;occupational therapy  x2 skilled hands for pt safety  -RB       Row Name 12/22/23 1213          General Information    Existing Precautions/Restrictions fall  -RB     Barriers to Rehab visual deficit  -RB       Row Name 12/22/23 1213          Living Environment    People in Home alone  -RB       Row Name 12/22/23 1213          Cognition    Orientation Status (Cognition) oriented to;person;place  -RB       Row Name 12/22/23 1213          Safety Issues, Functional Mobility    Safety Issues Affecting Function (Mobility) safety precautions follow-through/compliance;safety precaution awareness;awareness of need for assistance;insight into deficits/self-awareness;judgment;problem-solving;sequencing abilities  -RB     Impairments Affecting Function (Mobility) balance;cognition;endurance/activity tolerance;visual/perceptual;strength;shortness of breath;postural/trunk control  -RB               User Key  (r) = Recorded By, (t) = Taken By, (c) = Cosigned By      Initials Name Provider Type    RB Conchita Foster OT Occupational Therapist                     Mobility/ADL's        Row Name 12/22/23 1220          Bed Mobility    Bed Mobility supine-sit;scooting/bridging  -RB     Scooting/Bridging [place] (Bed Mobility) contact guard  -RB     Supine-Sit [place] (Bed Mobility) contact guard  -RB     Assistive Device (Bed Mobility) bed rails  -RB       Row Name 12/22/23 1220          Transfers    Transfers sit-stand transfer;stand-sit transfer;bed-chair transfer  -RB     Comment, (Transfers) many cues for visual scanning and head turns  -RB       Row Name 12/22/23 1220          Bed-Chair Transfer    Bed-Chair [place] (Transfers) contact guard;verbal cues;nonverbal cues (demo/gesture)  -RB     Assistive Device (Bed-Chair Transfers) walker, front-wheeled  -RB       Row Name 12/22/23 1220          Sit-Stand Transfer    Sit-Stand [place] (Transfers) contact guard;verbal cues;nonverbal cues (demo/gesture)  -RB     Assistive Device (Sit-Stand Transfers) walker, front-wheeled  -RB       Row Name 12/22/23 1220          Stand-Sit Transfer    Stand-Sit [place] (Transfers) verbal cues;nonverbal cues (demo/gesture);contact guard  -RB     Assistive Device (Stand-Sit Transfers) walker, front-wheeled  -RB       Row Name 12/22/23 1220          Functional Mobility    Functional Mobility- Ind. Level contact guard assist;verbal cues required;nonverbal cues required (demo/gesture)  -RB     Functional Mobility- Device walker, front-wheeled  -RB     Functional Mobility- Comment cues for visual scanning and walker positioning/directional turns  -RB               User Key  (r) = Recorded By, (t) = Taken By, (c) = Cosigned By      Initials Name Provider Type    RB Conchita Foster OT Occupational Therapist                   Obj/Interventions       Row Name 12/22/23 1222          Vision Assessment/Intervention    Visual Field Deficit homonymous hemianopsia, right  -RB       Row Name 12/22/23 1222          Range of Motion Comprehensive    General Range of Motion no range of motion deficits  identified  -RB       Row Name 12/22/23 1222          Strength Comprehensive (MMT)    Comment, General Manual Muscle Testing (MMT) Assessment BUE equal 3+/5, generalized weakness in BLE's  -RB       Row Name 12/22/23 1222          Balance    Comment, Balance CGA/Min A for standing balance x1-2 assist + walker.  -RB               User Key  (r) = Recorded By, (t) = Taken By, (c) = Cosigned By      Initials Name Provider Type    RB Conchita Foster OT Occupational Therapist                   Goals/Plan       Row Name 12/22/23 1224          Bed Mobility Goal 1 (OT)    Activity/Assistive Device (Bed Mobility Goal 1, OT) bed mobility activities, all  -RB     [place] Level/Cues Needed (Bed Mobility Goal 1, OT) modified independence  -RB     Time Frame (Bed Mobility Goal 1, OT) short term goal (STG);2 weeks  -RB     Progress/Outcomes (Bed Mobility Goal 1, OT) continuing progress toward goal  -RB       Row Name 12/22/23 1224          Transfer Goal 1 (OT)    Activity/Assistive Device (Transfer Goal 1, OT) transfers, all  -RB     [place] Level/Cues Needed (Transfer Goal 1, OT) modified independence  -RB     Time Frame (Transfer Goal 1, OT) 2 weeks  -RB     Progress/Outcome (Transfer Goal 1, OT) continuing progress toward goal  -RB       Row Name 12/22/23 1224          Dressing Goal 1 (OT)    Activity/Device (Dressing Goal 1, OT) dressing skills, all  -RB     [place]/Cues Needed (Dressing Goal 1, OT) modified independence  -RB     Time Frame (Dressing Goal 1, OT) short term goal (STG);2 weeks  -RB     Progress/Outcome (Dressing Goal 1, OT) continuing progress toward goal  -RB       Row Name 12/22/23 1224          Toileting Goal 1 (OT)    Activity/Device (Toileting Goal 1, OT) toileting skills, all  -RB     [place] Level/Cues Needed (Toileting Goal 1, OT) modified independence  -RB     Time Frame (Toileting Goal 1, OT) short term goal (STG);2 weeks  -RB     Progress/Outcome (Toileting Goal 1, OT) continuing  progress toward goal  -RB       Row Name 12/22/23 1224          Grooming Goal 1 (OT)    Activity/Device (Grooming Goal 1, OT) grooming skills, all  -RB     [place] (Grooming Goal 1, OT) modified independence  -RB     Time Frame (Grooming Goal 1, OT) short term goal (STG);2 weeks  -RB     Progress/Outcome (Grooming Goal 1, OT) continuing progress toward goal  -RB       Row Name 12/22/23 1224          Therapy Assessment/Plan (OT)    Planned Therapy Interventions (OT) transfer/mobility retraining;strengthening exercise;ROM/therapeutic exercise;activity tolerance training;adaptive equipment training;BADL retraining;functional balance retraining;occupation/activity based interventions;patient/caregiver education/training;cognitive/visual perception retraining  -RB               User Key  (r) = Recorded By, (t) = Taken By, (c) = Cosigned By      Initials Name Provider Type    RB Conchita Foster, [place] Occupational Therapist                   Clinical Impression       Row Name 12/22/23 1223          Pain Assessment    Pretreatment Pain Rating 0/10 - no pain  -RB     Posttreatment Pain Rating 0/10 - no pain  -RB       Row Name 12/22/23 1223          Plan of Care Review    Plan of Care Reviewed With patient;family  -RB     Progress no change  -RB       Row Name 12/22/23 1223          Therapy Assessment/Plan (OT)    Rehab Potential (OT) good, to achieve stated therapy goals  -RB     Criteria for Skilled Therapeutic Interventions Met (OT) yes;skilled treatment is necessary  -RB     Therapy Frequency (OT) 5 times/wk  -RB       Row Name 12/22/23 1223          Therapy Plan Review/Discharge Plan (OT)    Anticipated Discharge Disposition (OT) inpatient rehabilitation facility;skilled nursing facility  -RB       Row Name 12/22/23 1223          Vital Signs    Pre SpO2 (%) 97  -RB     O2 Delivery Pre Treatment supplemental O2  -RB     O2 Delivery Intra Treatment room air  -RB     Post SpO2 (%) 97  -RB     O2 Delivery Post  Treatment supplemental O2  -RB     Pre Patient Position Supine  -RB     Intra Patient Position Standing  -RB     Post Patient Position Sitting  -RB       Row Name 12/22/23 1223          Positioning and Restraints    Pre-Treatment Position in bed  -RB     Post Treatment Position chair  -RB     In Chair notified nsg;reclined;sitting;call light within reach;encouraged to call for assist;exit alarm on;legs elevated  -RB               User Key  (r) = Recorded By, (t) = Taken By, (c) = Cosigned By      Initials Name Provider Type    RB Conchita Foster OT Occupational Therapist                   Outcome Measures       Row Name 12/22/23 1225          How much help from another is currently needed...    Putting on and taking off regular lower body clothing? 2  -RB     Bathing (including washing, rinsing, and drying) 2  -RB     Toileting (which includes using toilet bed pan or urinal) 2  -RB     Putting on and taking off regular upper body clothing 2  -RB     Taking care of personal grooming (such as brushing teeth) 2  -RB     Eating meals 2  -RB     AM-PAC 6 Clicks Score (OT) 12  -RB       Row Name 12/22/23 1133          How much help from another person do you currently need...    Turning from your back to your side while in flat bed without using bedrails? 3  -MS     Moving from lying on back to sitting on the side of a flat bed without bedrails? 3  -MS     Moving to and from a bed to a chair (including a wheelchair)? 3  -MS     Standing up from a chair using your arms (e.g., wheelchair, bedside chair)? 3  -MS     Climbing 3-5 steps with a railing? 2  -MS     To walk in hospital room? 3  -MS     AM-PAC 6 Clicks Score (PT) 17  -MS     Highest Level of Mobility Goal 5 --> Static standing  -MS       Row Name 12/22/23 1225          Modified [place] Scale    Modified Daniel Scale 4 - Moderately severe disability.  Unable to walk without assistance, and unable to attend to own bodily needs without assistance.  -RB       Row  Name 12/22/23 1225 12/22/23 1133       Functional Assessment    Outcome Measure Options AM-PAC 6 Clicks Daily Activity (OT);Modified [place]  -RB AM-PAC 6 Clicks Basic Mobility (PT)  -MS              User Key  (r) = Recorded By, (t) = Taken By, (c) = Cosigned By      Initials Name Provider Type    MS Silvano Mayers JOSE, PT Physical Therapist    RB Conchita Foster, OT Occupational Therapist                    Occupational Therapy Education       Title: PT OT SLP Therapies (In Progress)       Topic: Occupational Therapy (Not Started)       Point: ADL training (Not Started)       Description:   Instruct learner(s) on proper safety adaptation and remediation techniques during self care or transfers.   Instruct in proper use of assistive devices.                  Learner Progress:  Not documented in this visit.              Point: Home exercise program (Not Started)       Description:   Instruct learner(s) on appropriate technique for monitoring, assisting and/or progressing therapeutic exercises/activities.                  Learner Progress:  Not documented in this visit.              Point: Precautions (Not Started)       Description:   Instruct learner(s) on prescribed precautions during self-care and functional transfers.                  Learner Progress:  Not documented in this visit.              Point: Body mechanics (Not Started)       Description:   Instruct learner(s) on proper positioning and spine alignment during self-care, functional mobility activities and/or exercises.                  Learner Progress:  Not documented in this visit.                                  OT Recommendation and Plan  Planned Therapy Interventions (OT): transfer/mobility retraining, strengthening exercise, ROM/therapeutic exercise, activity tolerance training, adaptive equipment training, BADL retraining, functional balance retraining, occupation/activity based interventions, patient/caregiver education/training,  cognitive/visual perception retraining  Therapy Frequency (OT): 5 times/wk  Plan of Care Review  Plan of Care Reviewed With: patient, family  Progress: no change     Time Calculation:         Time Calculation- OT       Row Name 12/22/23 1212             Time Calculation- OT    OT Start Time 1020  -RB      OT Stop Time 1100  -RB      OT Time Calculation (min) 40 min  -RB      Total Timed Code Minutes- OT 20 minute(s)  -RB      OT Received On 12/22/23  -RB      OT - Next Appointment 12/25/23  -RB      OT Goal Re-Cert Due Date 01/05/24  -RB         Timed Charges    14053 - OT Therapeutic Activity Minutes 20  -RB         Untimed Charges    OT Eval/Re-eval Minutes 20  -RB         Total Minutes    Timed Charges Total Minutes 20  -RB      Untimed Charges Total Minutes 20  -RB       Total Minutes 40  -RB                User Key  (r) = Recorded By, (t) = Taken By, (c) = Cosigned By      Initials Name Provider Type    RB Conchita Foster, OT Occupational Therapist                  Therapy Charges for Today       Code Description Service Date Service Provider Modifiers Qty    [id number]  OT THERAPEUTIC ACT EA 15 MIN 12/22/2023 Conchita Foster OT GO 1    [id number]  OT EVAL MOD COMPLEXITY 3 12/22/2023 Conchita Foster OT GO 1                 Conchita Foster OT  12/22/2023   PCP; Dr. Johntahan Sykes

## 2025-06-11 ENCOUNTER — APPOINTMENT (OUTPATIENT)
Dept: INTERNAL MEDICINE | Facility: CLINIC | Age: 85
End: 2025-06-11
Payer: MEDICARE

## 2025-06-11 VITALS — SYSTOLIC BLOOD PRESSURE: 142 MMHG | DIASTOLIC BLOOD PRESSURE: 82 MMHG | HEIGHT: 65 IN

## 2025-06-11 PROCEDURE — G2211 COMPLEX E/M VISIT ADD ON: CPT

## 2025-06-11 PROCEDURE — 99213 OFFICE O/P EST LOW 20 MIN: CPT
